# Patient Record
Sex: MALE | Race: BLACK OR AFRICAN AMERICAN | NOT HISPANIC OR LATINO | Employment: UNEMPLOYED | URBAN - METROPOLITAN AREA
[De-identification: names, ages, dates, MRNs, and addresses within clinical notes are randomized per-mention and may not be internally consistent; named-entity substitution may affect disease eponyms.]

---

## 2023-05-25 ENCOUNTER — HOSPITAL ENCOUNTER (INPATIENT)
Facility: HOSPITAL | Age: 43
LOS: 4 days | Discharge: HOME | DRG: 502 | End: 2023-05-29
Attending: EMERGENCY MEDICINE | Admitting: EMERGENCY MEDICINE
Payer: MEDICAID

## 2023-05-25 DIAGNOSIS — S90.851A FOREIGN BODY OF SKIN OF PLANTAR ASPECT OF RIGHT FOOT: ICD-10-CM

## 2023-05-25 DIAGNOSIS — M79.10 MYALGIA: ICD-10-CM

## 2023-05-25 DIAGNOSIS — M79.671 BILATERAL FOOT PAIN: ICD-10-CM

## 2023-05-25 DIAGNOSIS — B20 HIV INFECTION, UNSPECIFIED SYMPTOM STATUS: ICD-10-CM

## 2023-05-25 DIAGNOSIS — L02.414 ABSCESS OF LEFT UPPER EXTREMITY: Primary | ICD-10-CM

## 2023-05-25 DIAGNOSIS — S92.414A CLOSED NONDISPLACED FRACTURE OF PROXIMAL PHALANX OF RIGHT GREAT TOE, INITIAL ENCOUNTER: ICD-10-CM

## 2023-05-25 DIAGNOSIS — R07.9 CHEST PAIN: ICD-10-CM

## 2023-05-25 DIAGNOSIS — M79.602 LEFT ARM PAIN: ICD-10-CM

## 2023-05-25 DIAGNOSIS — M79.672 BILATERAL FOOT PAIN: ICD-10-CM

## 2023-05-25 DIAGNOSIS — S50.852A FOREIGN BODY IN LEFT FOREARM, INITIAL ENCOUNTER: ICD-10-CM

## 2023-05-25 PROBLEM — S92.421A DISPLACED FRACTURE OF DISTAL PHALANX OF RIGHT GREAT TOE, INITIAL ENCOUNTER FOR CLOSED FRACTURE: Status: ACTIVE | Noted: 2023-05-25

## 2023-05-25 PROBLEM — F43.10 PTSD (POST-TRAUMATIC STRESS DISORDER): Status: ACTIVE | Noted: 2023-05-25

## 2023-05-25 PROBLEM — F12.10 TETRAHYDROCANNABINOL (THC) USE DISORDER, MILD, ABUSE: Status: ACTIVE | Noted: 2023-05-25

## 2023-05-25 PROBLEM — L02.419 ARM ABSCESS: Status: ACTIVE | Noted: 2023-05-25

## 2023-05-25 LAB
ALBUMIN SERPL BCP-MCNC: 3.3 G/DL (ref 3.5–5.2)
ALP SERPL-CCNC: 104 U/L (ref 55–135)
ALT SERPL W/O P-5'-P-CCNC: 19 U/L (ref 10–44)
ANION GAP SERPL CALC-SCNC: 13 MMOL/L (ref 8–16)
AST SERPL-CCNC: 28 U/L (ref 10–40)
BASOPHILS # BLD AUTO: 0.03 K/UL (ref 0–0.2)
BASOPHILS NFR BLD: 0.3 % (ref 0–1.9)
BILIRUB SERPL-MCNC: 0.4 MG/DL (ref 0.1–1)
BUN SERPL-MCNC: 17 MG/DL (ref 6–20)
CALCIUM SERPL-MCNC: 9.2 MG/DL (ref 8.7–10.5)
CHLORIDE SERPL-SCNC: 101 MMOL/L (ref 95–110)
CO2 SERPL-SCNC: 21 MMOL/L (ref 23–29)
CREAT SERPL-MCNC: 1.1 MG/DL (ref 0.5–1.4)
CRP SERPL-MCNC: 232.9 MG/L (ref 0–8.2)
DIFFERENTIAL METHOD: ABNORMAL
EOSINOPHIL # BLD AUTO: 0.3 K/UL (ref 0–0.5)
EOSINOPHIL NFR BLD: 2.9 % (ref 0–8)
ERYTHROCYTE [DISTWIDTH] IN BLOOD BY AUTOMATED COUNT: 13.1 % (ref 11.5–14.5)
EST. GFR  (NO RACE VARIABLE): >60 ML/MIN/1.73 M^2
GLUCOSE SERPL-MCNC: 100 MG/DL (ref 70–110)
HCT VFR BLD AUTO: 40.1 % (ref 40–54)
HGB BLD-MCNC: 13.2 G/DL (ref 14–18)
IMM GRANULOCYTES # BLD AUTO: 0.08 K/UL (ref 0–0.04)
IMM GRANULOCYTES NFR BLD AUTO: 0.8 % (ref 0–0.5)
LACTATE SERPL-SCNC: 2.9 MMOL/L (ref 0.5–2.2)
LYMPHOCYTES # BLD AUTO: 0.8 K/UL (ref 1–4.8)
LYMPHOCYTES NFR BLD: 8.1 % (ref 18–48)
MCH RBC QN AUTO: 28.4 PG (ref 27–31)
MCHC RBC AUTO-ENTMCNC: 32.9 G/DL (ref 32–36)
MCV RBC AUTO: 86 FL (ref 82–98)
MONOCYTES # BLD AUTO: 0.6 K/UL (ref 0.3–1)
MONOCYTES NFR BLD: 6 % (ref 4–15)
NEUTROPHILS # BLD AUTO: 7.8 K/UL (ref 1.8–7.7)
NEUTROPHILS NFR BLD: 81.9 % (ref 38–73)
NRBC BLD-RTO: 0 /100 WBC
PLATELET # BLD AUTO: ABNORMAL K/UL (ref 150–450)
PLATELET BLD QL SMEAR: ABNORMAL
PMV BLD AUTO: ABNORMAL FL (ref 9.2–12.9)
POCT GLUCOSE: 110 MG/DL (ref 70–110)
POTASSIUM SERPL-SCNC: 4.6 MMOL/L (ref 3.5–5.1)
PROCALCITONIN SERPL IA-MCNC: 0.52 NG/ML
PROT SERPL-MCNC: 8.1 G/DL (ref 6–8.4)
RBC # BLD AUTO: 4.64 M/UL (ref 4.6–6.2)
SODIUM SERPL-SCNC: 135 MMOL/L (ref 136–145)
WBC # BLD AUTO: 9.52 K/UL (ref 3.9–12.7)

## 2023-05-25 PROCEDURE — 86140 C-REACTIVE PROTEIN: CPT | Performed by: PHYSICIAN ASSISTANT

## 2023-05-25 PROCEDURE — 93010 ELECTROCARDIOGRAM REPORT: CPT | Mod: ,,, | Performed by: INTERNAL MEDICINE

## 2023-05-25 PROCEDURE — 93010 EKG 12-LEAD: ICD-10-PCS | Mod: ,,, | Performed by: INTERNAL MEDICINE

## 2023-05-25 PROCEDURE — 96367 TX/PROPH/DG ADDL SEQ IV INF: CPT

## 2023-05-25 PROCEDURE — 80053 COMPREHEN METABOLIC PANEL: CPT | Performed by: PHYSICIAN ASSISTANT

## 2023-05-25 PROCEDURE — 96365 THER/PROPH/DIAG IV INF INIT: CPT

## 2023-05-25 PROCEDURE — 85025 COMPLETE CBC W/AUTO DIFF WBC: CPT | Performed by: PHYSICIAN ASSISTANT

## 2023-05-25 PROCEDURE — 87040 BLOOD CULTURE FOR BACTERIA: CPT | Mod: 59 | Performed by: PHYSICIAN ASSISTANT

## 2023-05-25 PROCEDURE — 83605 ASSAY OF LACTIC ACID: CPT | Performed by: PHYSICIAN ASSISTANT

## 2023-05-25 PROCEDURE — 86592 SYPHILIS TEST NON-TREP QUAL: CPT | Performed by: PHYSICIAN ASSISTANT

## 2023-05-25 PROCEDURE — 99285 EMERGENCY DEPT VISIT HI MDM: CPT | Mod: 25

## 2023-05-25 PROCEDURE — 25000003 PHARM REV CODE 250: Performed by: PHYSICIAN ASSISTANT

## 2023-05-25 PROCEDURE — 93005 ELECTROCARDIOGRAM TRACING: CPT

## 2023-05-25 PROCEDURE — 11000001 HC ACUTE MED/SURG PRIVATE ROOM

## 2023-05-25 PROCEDURE — 63600175 PHARM REV CODE 636 W HCPCS: Performed by: PHYSICIAN ASSISTANT

## 2023-05-25 PROCEDURE — 86361 T CELL ABSOLUTE COUNT: CPT | Performed by: PHYSICIAN ASSISTANT

## 2023-05-25 PROCEDURE — 84145 PROCALCITONIN (PCT): CPT | Performed by: PHYSICIAN ASSISTANT

## 2023-05-25 PROCEDURE — 10061 I&D ABSCESS COMP/MULTIPLE: CPT

## 2023-05-25 PROCEDURE — 82962 GLUCOSE BLOOD TEST: CPT

## 2023-05-25 RX ORDER — TALC
6 POWDER (GRAM) TOPICAL NIGHTLY PRN
Status: DISCONTINUED | OUTPATIENT
Start: 2023-05-25 | End: 2023-05-29 | Stop reason: HOSPADM

## 2023-05-25 RX ORDER — OXYCODONE AND ACETAMINOPHEN 5; 325 MG/1; MG/1
1 TABLET ORAL
Status: COMPLETED | OUTPATIENT
Start: 2023-05-25 | End: 2023-05-25

## 2023-05-25 RX ORDER — IBUPROFEN 200 MG
16 TABLET ORAL
Status: DISCONTINUED | OUTPATIENT
Start: 2023-05-25 | End: 2023-05-26

## 2023-05-25 RX ORDER — AMOXICILLIN 250 MG
1 CAPSULE ORAL DAILY PRN
Status: DISCONTINUED | OUTPATIENT
Start: 2023-05-25 | End: 2023-05-29 | Stop reason: HOSPADM

## 2023-05-25 RX ORDER — OXYCODONE AND ACETAMINOPHEN 5; 325 MG/1; MG/1
1 TABLET ORAL EVERY 8 HOURS PRN
Status: DISCONTINUED | OUTPATIENT
Start: 2023-05-25 | End: 2023-05-27

## 2023-05-25 RX ORDER — SODIUM CHLORIDE 0.9 % (FLUSH) 0.9 %
10 SYRINGE (ML) INJECTION
Status: DISCONTINUED | OUTPATIENT
Start: 2023-05-25 | End: 2023-05-29 | Stop reason: HOSPADM

## 2023-05-25 RX ORDER — SERTRALINE HYDROCHLORIDE 50 MG/1
100 TABLET, FILM COATED ORAL DAILY
Status: DISCONTINUED | OUTPATIENT
Start: 2023-05-26 | End: 2023-05-29 | Stop reason: HOSPADM

## 2023-05-25 RX ORDER — NALOXONE HCL 0.4 MG/ML
0.02 VIAL (ML) INJECTION
Status: DISCONTINUED | OUTPATIENT
Start: 2023-05-25 | End: 2023-05-29 | Stop reason: HOSPADM

## 2023-05-25 RX ORDER — IBUPROFEN 200 MG
24 TABLET ORAL
Status: DISCONTINUED | OUTPATIENT
Start: 2023-05-25 | End: 2023-05-26

## 2023-05-25 RX ORDER — AMITRIPTYLINE HYDROCHLORIDE 50 MG/1
1 TABLET, FILM COATED ORAL 2 TIMES DAILY
COMMUNITY
Start: 2023-05-19 | End: 2023-07-30 | Stop reason: SDUPTHER

## 2023-05-25 RX ORDER — BICTEGRAVIR SODIUM, EMTRICITABINE, AND TENOFOVIR ALAFENAMIDE FUMARATE 50; 200; 25 MG/1; MG/1; MG/1
1 TABLET ORAL DAILY
COMMUNITY
Start: 2023-03-27 | End: 2023-07-30 | Stop reason: SDUPTHER

## 2023-05-25 RX ORDER — LANOLIN ALCOHOL/MO/W.PET/CERES
800 CREAM (GRAM) TOPICAL
Status: DISCONTINUED | OUTPATIENT
Start: 2023-05-25 | End: 2023-05-26

## 2023-05-25 RX ORDER — GLUCAGON 1 MG
1 KIT INJECTION
Status: DISCONTINUED | OUTPATIENT
Start: 2023-05-25 | End: 2023-05-26

## 2023-05-25 RX ORDER — CEFTRIAXONE 2 G/50ML
2 INJECTION, SOLUTION INTRAVENOUS
Status: COMPLETED | OUTPATIENT
Start: 2023-05-25 | End: 2023-05-25

## 2023-05-25 RX ORDER — SODIUM CHLORIDE 0.9 % (FLUSH) 0.9 %
10 SYRINGE (ML) INJECTION EVERY 8 HOURS
Status: DISCONTINUED | OUTPATIENT
Start: 2023-05-25 | End: 2023-05-25

## 2023-05-25 RX ORDER — SERTRALINE HYDROCHLORIDE 100 MG/1
100 TABLET, FILM COATED ORAL DAILY
COMMUNITY
Start: 2023-05-19 | End: 2023-07-30 | Stop reason: SDUPTHER

## 2023-05-25 RX ORDER — AMITRIPTYLINE HYDROCHLORIDE 25 MG/1
50 TABLET, FILM COATED ORAL 2 TIMES DAILY
Status: DISCONTINUED | OUTPATIENT
Start: 2023-05-25 | End: 2023-05-29 | Stop reason: HOSPADM

## 2023-05-25 RX ORDER — ACETAMINOPHEN 325 MG/1
650 TABLET ORAL EVERY 4 HOURS PRN
Status: DISCONTINUED | OUTPATIENT
Start: 2023-05-25 | End: 2023-05-29 | Stop reason: HOSPADM

## 2023-05-25 RX ORDER — LIDOCAINE HYDROCHLORIDE 10 MG/ML
10 INJECTION INFILTRATION; PERINEURAL
Status: COMPLETED | OUTPATIENT
Start: 2023-05-25 | End: 2023-05-25

## 2023-05-25 RX ORDER — CEFEPIME HYDROCHLORIDE 1 G/50ML
2 INJECTION, SOLUTION INTRAVENOUS
Status: DISCONTINUED | OUTPATIENT
Start: 2023-05-26 | End: 2023-05-25

## 2023-05-25 RX ADMIN — LIDOCAINE HYDROCHLORIDE 10 ML: 10 INJECTION, SOLUTION INFILTRATION; PERINEURAL at 05:05

## 2023-05-25 RX ADMIN — VANCOMYCIN HYDROCHLORIDE 1000 MG: 1 INJECTION, POWDER, LYOPHILIZED, FOR SOLUTION INTRAVENOUS at 05:05

## 2023-05-25 RX ADMIN — OXYCODONE AND ACETAMINOPHEN 1 TABLET: 5; 325 TABLET ORAL at 04:05

## 2023-05-25 RX ADMIN — SODIUM CHLORIDE 1178 ML: 9 INJECTION, SOLUTION INTRAVENOUS at 05:05

## 2023-05-25 RX ADMIN — SODIUM CHLORIDE 1000 ML: 9 INJECTION, SOLUTION INTRAVENOUS at 04:05

## 2023-05-25 RX ADMIN — CEFTRIAXONE 2 G: 2 INJECTION, SOLUTION INTRAVENOUS at 04:05

## 2023-05-25 NOTE — ASSESSMENT & PLAN NOTE
Complained of toe pain in the ED, x-ray with Minimally displaced intra-articular fracture involving the medial base of the proximal phalanx of the right great toe  Placed in post op shoe in ED  Ortho consulted

## 2023-05-25 NOTE — ED PROVIDER NOTES
Encounter Date: 5/25/2023    SCRIBE #1 NOTE: I, Jin Blanco, am scribing for, and in the presence of,  Wisam Beard PA-C. I have scribed the following portions of the note - Other sections scribed: HPI, ROS.     History     Chief Complaint   Patient presents with    Insect Bite     EMS called out to facility for a homeless male seeing treatment for a bug bite to L arm.    Ken Guerrero Jr. is a 42 y.o. male with a PMHx of HIV, who presents to the ED for evaluation of left upper arm pain onset 3-4 days ago. Patient also c/o bilateral foot pain onset 1-2 weeks ago. Patient states the left arm pain radiates from his mid arm up to his left shoulder. He has an insect bite to the area that is painful with touch. Notes the bilateral foot pain radiates from his mid planum foot to the heel. Patient had surgery two years ago on his left foot due to him wearing no shoes and causing an infection to the area. No hx of blood clots. He is allergic to ibuprofen and Vicodin. Denies fever and NVD.    The history is provided by the patient. No  was used.   Review of patient's allergies indicates:  No Known Allergies  No past medical history on file.  No past surgical history on file.  No family history on file.     Review of Systems   Constitutional:  Negative for fever.   HENT:  Negative for congestion, sore throat and trouble swallowing.    Respiratory:  Negative for cough and shortness of breath.    Cardiovascular:  Negative for chest pain.   Gastrointestinal:  Negative for abdominal pain, constipation, diarrhea, nausea and vomiting.   Genitourinary:  Negative for dysuria, flank pain, frequency and urgency.   Musculoskeletal:  Negative for back pain.        (+) arm pain (+) foot pain   Skin:  Negative for rash.   Neurological:  Negative for headaches.   All other systems reviewed and are negative.    Physical Exam     Initial Vitals [05/25/23 1512]   BP Pulse Resp Temp SpO2   (!) 146/80 88 18 98 °F  (36.7 °C) 98 %      MAP       --         Physical Exam    Nursing note and vitals reviewed.  Constitutional: He appears well-developed and well-nourished. He is not diaphoretic. No distress.   HENT:   Head: Atraumatic.   Right Ear: External ear normal.   Left Ear: External ear normal.   Mouth/Throat: Oropharynx is clear and moist.   Eyes: Conjunctivae are normal.   Neck: No tracheal deviation present.   Normal range of motion.  Cardiovascular:  Normal rate and regular rhythm.           Pulmonary/Chest: No accessory muscle usage or stridor. No tachypnea. No respiratory distress.   Abdominal: Abdomen is soft. He exhibits no distension. There is no abdominal tenderness. There is no guarding.   Musculoskeletal:      Cervical back: Normal range of motion.      Comments: Diffuse tenderness to soft touch to the entirety of the bilateral feet.  No obvious deformities.  No erythema.  Punctate open wound to the plantar surface of the right foot over the heel.  No obvious foreign body on exam.  Full ROM of lower extremities.  Ambulatory.  Distal skin perfusion normal.     Neurological: He has normal strength. He displays no tremor. He displays no seizure activity. Coordination and gait normal.   Skin: Skin is intact. Capillary refill takes less than 2 seconds. No cyanosis.   Diffuse scattered maculopapular hyperpigmented skin lesions. No palmar or plantar involvement. Nontender. No erythema or mucosal lesions.     Large area of tenderness, erythema, and induration to the left anterior biceps.  No extension of erythema over the joints.  Full ROM of upper extremity joints.  No active drainage.  Soft compartments.      No obvious foreign body to the left forearm.       ED Course   I & D - Incision and Drainage    Date/Time: 5/25/2023 8:19 PM  Location procedure was performed: [facility] EMERGENCY DEPARTMENT  Performed by: Wisam Beard PA-C  Authorized by: Jori Kovacs MD   Consent Done: Yes  Consent: Verbal consent  obtained.  Consent given by: patient  Type: abscess  Location: LUE.  Anesthesia: local infiltration    Anesthesia:  Local Anesthetic: lidocaine 1% without epinephrine    Patient sedated: no  Scalpel size: 11  Incision type: single straight  Complexity: complex  Drainage: pus  Drainage amount: copious  Wound treatment: incision, wound left open, deloculation and expression of material  Packing material: 1/4 in gauze  Complications: No  Specimens: No  Implants: No  Patient tolerance: Patient tolerated the procedure well with no immediate complications      Labs Reviewed   CBC W/ AUTO DIFFERENTIAL - Abnormal; Notable for the following components:       Result Value    Hemoglobin 13.2 (*)     Immature Granulocytes 0.8 (*)     Gran # (ANC) 7.8 (*)     Immature Grans (Abs) 0.08 (*)     Lymph # 0.8 (*)     Gran % 81.9 (*)     Lymph % 8.1 (*)     Platelet Estimate Clumped (*)     All other components within normal limits    Narrative:     Release to patient->Immediate   COMPREHENSIVE METABOLIC PANEL - Abnormal; Notable for the following components:    Sodium 135 (*)     CO2 21 (*)     Albumin 3.3 (*)     All other components within normal limits    Narrative:     Release to patient->Immediate   C-REACTIVE PROTEIN - Abnormal; Notable for the following components:    .9 (*)     All other components within normal limits    Narrative:     Release to patient->Immediate   PROCALCITONIN - Abnormal; Notable for the following components:    Procalcitonin 0.52 (*)     All other components within normal limits    Narrative:     Release to patient->Immediate   LACTIC ACID, PLASMA - Abnormal; Notable for the following components:    Lactate (Lactic Acid) 2.9 (*)     All other components within normal limits    Narrative:     Release to patient->Immediate   CULTURE, BLOOD   CULTURE, BLOOD   CULTURE, AEROBIC  (SPECIFY SOURCE)   RPR   T-HELPER CELLS (CD4) COUNT   DRUG SCREEN PANEL, URINE EMERGENCY   POCT GLUCOSE     EKG Readings:  (Independently Interpreted)   Initial Reading: No STEMI. Rhythm: Normal Sinus Rhythm. Heart Rate: 78. Axis: Normal.     Imaging Results               X-Ray Humerus 2 View Left (Final result)  Result time 05/25/23 16:30:04      Final result by Darío Caballero MD (05/25/23 16:30:04)                   Impression:      No evidence for acute fracture, bone destruction, or dislocation.    4 mm lucency projecting over the soft tissues of the left upper arm at the level of the mid humerus.  In this patient with history of bone bite, this may be related to skin puncture although small abscess cannot be excluded.    Linear metallic density projecting over the proximal left forearm measuring approximately 7 mm in length.  A metallic soft tissue foreign body cannot be excluded.    This report was flagged in Epic as abnormal.      Electronically signed by: Darío Caballero MD  Date:    05/25/2023  Time:    16:30               Narrative:    EXAMINATION:  XR HUMERUS 2 VIEW LEFT    CLINICAL HISTORY:  Pain in left arm    TECHNIQUE:  Two views of the left humerus were obtained.    COMPARISON:  None    FINDINGS:  The left humerus appears intact.  There is no evidence for acute fracture or bone destruction.  There is a rounded lucency measuring approximately 4 mm in size projecting over the soft tissues of the left upper arm at approximately the level of the mid humerus.  This may represent a small air bubble within the soft tissues in this patient with history of bug bite.  Small abscess cavity cannot be completely excluded.  On 1 of the views, there is a linear metallic density projecting over the proximal left forearm.  A soft tissue foreign body cannot be excluded.  If clinically indicated, further evaluation with dedicated views of the left forearm could be obtained.                                       X-Ray Foot Complete Bilateral (Final result)  Result time 05/25/23 16:32:05      Final result by Darío Caballero MD (05/25/23  16:32:05)                   Impression:      Minimally displaced intra-articular fracture involving the medial base of the proximal phalanx of the right great toe.      Electronically signed by: Darío Caballero MD  Date:    05/25/2023  Time:    16:32               Narrative:    EXAMINATION:  XR FOOT COMPLETE 3 VIEW BILATERAL    CLINICAL HISTORY:  Pain in right foot    TECHNIQUE:  AP, lateral, and oblique views of both feet were performed.    COMPARISON:  None    FINDINGS:  There is a fracture involving the medial base of the proximal phalanx of the right great toe with intra-articular extension of the fracture line.  This is likely acute or subacute.  The remainder of the bones appear intact.  There is no evidence for dislocation.  No bony erosions are identified.  Soft tissues are unremarkable.                                       Medications   vancomycin (VANCOCIN) 1,000 mg in dextrose 5 % (D5W) 250 mL IVPB (Vial-Mate) (1,000 mg Intravenous Trough Due As Scheduled Before Dose 5/27/23 0500)   melatonin tablet 6 mg (has no administration in time range)   senna-docusate 8.6-50 mg per tablet 1 tablet (has no administration in time range)   acetaminophen tablet 650 mg (has no administration in time range)   naloxone 0.4 mg/mL injection 0.02 mg (has no administration in time range)   magnesium oxide tablet 800 mg (has no administration in time range)   magnesium oxide tablet 800 mg (has no administration in time range)   glucose chewable tablet 16 g (has no administration in time range)   glucose chewable tablet 24 g (has no administration in time range)   dextrose 50% injection 12.5 g (has no administration in time range)   dextrose 50% injection 25 g (has no administration in time range)   glucagon (human recombinant) injection 1 mg (has no administration in time range)   piperacillin-tazobactam (ZOSYN) 4.5 g in dextrose 5 % in water (D5W) 5 % 100 mL IVPB (MB+) (has no administration in time range)   sodium chloride  0.9% flush 10 mL (has no administration in time range)   amitriptyline tablet 50 mg (has no administration in time range)   chxzirocr-osyxaenv-olxwnqv ala -25 mg (25 kg or greater) 1 tablet (has no administration in time range)   sertraline tablet 100 mg (has no administration in time range)   vancomycin - pharmacy to dose (has no administration in time range)   oxyCODONE-acetaminophen 5-325 mg per tablet 1 tablet (has no administration in time range)   cefTRIAXone 2 gram/50 mL IVPB 2 g (0 g Intravenous Stopped 5/25/23 1736)   oxyCODONE-acetaminophen 5-325 mg per tablet 1 tablet (1 tablet Oral Given 5/25/23 1631)   sodium chloride 0.9% bolus 1,000 mL 1,000 mL (0 mLs Intravenous Stopped 5/25/23 1736)   sodium chloride 0.9% bolus 1,178 mL 1,178 mL (1,178 mLs Intravenous New Bag 5/25/23 1757)   LIDOcaine HCL 10 mg/ml (1%) injection 10 mL (10 mLs Infiltration Given 5/25/23 1758)   vancomycin (VANCOCIN) 1,000 mg in dextrose 5 % (D5W) 250 mL IVPB (Vial-Mate) (0 mg Intravenous Stopped 5/25/23 1927)     Medical Decision Making:   History:   Old Medical Records: I decided to obtain old medical records.  Old Records Summarized: records from clinic visits.       <> Summary of Records: External documents reviewed.  Clinical Tests:   Lab Tests: Ordered and Reviewed  Radiological Study: Ordered and Reviewed  ED Management:  Multiple complaints.  Advised by EMS he may be homeless.  HIV.  Poor historian.  Noncompliant with his antivirals and unclear CD4 count.    Primary complaint is a large abscess to the left bicep drained in the ED without complication.  Possibly septic in the setting of immunocompromised state with elevated lactic acid, procalcitonin, and CRP.  No evidence of joint involvement or necrotizing fasciitis.    Remote foreign body no to the left forearm which I do not feel is related to his current infection today.  No indication for removal today as it is likely chronic.  There is a separate foreign body to  the left plantar surface which appears acute.  Patient refuses removal in the ED today despite my recommendation.  No signs of active infectious process to the plantar surface of the foot.  Incidentally, he also appears to have an acute fracture of the right great toe proximal phalanx.  Closed.  No significant displacement.  Postop shoe for comfort.    I feel patient is high-risk for follow-up and compliance with antibiotic regimen as an outpatient.  Given that patient is potentially septic in his immunocompromised state with multiple issues at the same time, I feel he would benefit from IV antibiotics and repeat evaluation to ensure improvement.  Patient agreeable to plan.        Scribe Attestation:   Scribe #1: I performed the above scribed service and the documentation accurately describes the services I performed. I attest to the accuracy of the note.      ED Course as of 05/25/23 2027   Thu May 25, 2023   1711 Estimated CD4 count 771 [JM]      ED Course User Index  [JM] Jori Kovacs MD          I, Wisam Beard PA-C, personally performed the services described in this documentation.  All medical record entries made by the scribe were at my direction and in my presence.  I have reviewed the chart and agree that the record reflects my personal performance and is accurate and complete.        Clinical Impression:   Final diagnoses:  [M79.602] Left arm pain  [M79.671, M79.672] Bilateral foot pain  [M79.10] Myalgia  [S92.414A] Closed nondisplaced fracture of proximal phalanx of right great toe, initial encounter  [B20] HIV infection, unspecified symptom status  [S90.851A] Foreign body of skin of plantar aspect of right foot  [S50.852A] Foreign body in left forearm, initial encounter  [L02.414] Abscess of left upper extremity (Primary)  [R07.9] Chest pain        ED Disposition Condition    Admit Stable                Wisam Beard PA-C  05/25/23 2027

## 2023-05-25 NOTE — ED NOTES
"Lt bicep area warm/ red/ tight and VERY tender to touch. Many, many "sores" on his arms and legs.   Pain to both feet bilat w no evident trauma noted.   Patient endorses: PMH significant for HIV, inconsistent compliance w meds.  He does not make eye contact, Over reacts to being touched.  Dirty and and mismatched shoes, NO socks.  Appears homeless.     "

## 2023-05-25 NOTE — ASSESSMENT & PLAN NOTE
He reports a history of HIV disease  On biktarvy, will resume though unclear as to compliance he states daily but then reports sometimes weekly compliance. Check HIV RNA and CD4 count      This patient in known to have HIV+ status (Have detected HIV PCR but never CD4 <200 or AIDS defining illness). Labs reviewed- No results found for: CD4, No results found for: HIVDNAPCR.

## 2023-05-25 NOTE — PHARMACY MED REC
"Admission Medication History     The home medication history was taken by Carol Adam.    You may go to "Admission" then "Reconcile Home Medications" tabs to review and/or act upon these items.     The home medication list has been updated by the Pharmacy department.   Please read ALL comments highlighted in yellow.   Please address this information as you see fit.    Feel free to contact us if you have any questions or require assistance.      Medications listed below were obtained from: Patient/family and Analytic software- [employer]  (Not in a hospital admission)          Carol Adam  [phone]                 .        "

## 2023-05-25 NOTE — PROGRESS NOTES
Pharmacokinetic Initial Assessment: IV Vancomycin    Assessment/Plan:    Initiate intravenous vancomycin with loading dose of 1000 mg once followed by a maintenance dose of vancomycin 1000 mg IV every 12 hours  Desired empiric serum trough concentration is 10 to 20 mcg/mL  Draw vancomycin trough level 60 min prior to fourth dose on 5/27/23 at approximately 05:00  Pharmacy will continue to follow and monitor vancomycin.      Please contact pharmacy at extension [phone] with any questions regarding this assessment.     Thank you for the consult,   Joanne Staley       Patient brief summary:  Gisela Guerrero Jr. is a 42 y.o. male initiated on antimicrobial therapy with IV Vancomycin for treatment of suspected skin & soft tissue infection    Drug Allergies:   Review of patient's allergies indicates:  No Known Allergies    Actual Body Weight:   72.6 kg    Renal Function:   Estimated Creatinine Clearance: 91.7 mL/min (based on SCr of 1.1 mg/dL).,     Dialysis Method (if applicable):  N/A    CBC (last 72 hours):  Recent Labs   Lab Result Units 05/25/23  1604   WBC K/uL 9.52   Hemoglobin g/dL 13.2*   Hematocrit % 40.1   Platelets K/uL SEE COMMENT   Gran % % 81.9*   Lymph % % 8.1*   Mono % % 6.0   Eosinophil % % 2.9   Basophil % % 0.3   Differential Method  Automated       Metabolic Panel (last 72 hours):  Recent Labs   Lab Result Units 05/25/23  1604   Sodium mmol/L 135*   Potassium mmol/L 4.6   Chloride mmol/L 101   CO2 mmol/L 21*   Glucose mg/dL 100   BUN mg/dL 17   Creatinine mg/dL 1.1   Albumin g/dL 3.3*   Total Bilirubin mg/dL 0.4   Alkaline Phosphatase U/L 104   AST U/L 28   ALT U/L 19       Drug levels (last 3 results):  No results for input(s): VANCOMYCINRA, VANCORANDOM, VANCOMYCINPE, VANCOPEAK, VANCOMYCINTR, VANCOTROUGH in the last 72 hours.    Microbiologic Results:  Microbiology Results (last 7 days)       Procedure Component Value Units Date/Time    Blood Culture #2 **CANNOT BE ORDERED STAT** [[id number]] Collected:  05/25/23 1608    Order Status: Sent Specimen: Blood from Peripheral, Hand, Right Updated: 05/25/23 1617    Blood Culture #1 **CANNOT BE ORDERED STAT** [[id number]] Collected: 05/25/23 1605    Order Status: Sent Specimen: Blood from Peripheral, Forearm, Right Updated: 05/25/23 1618

## 2023-05-26 ENCOUNTER — ANESTHESIA EVENT (OUTPATIENT)
Dept: SURGERY | Facility: HOSPITAL | Age: 43
DRG: 502 | End: 2023-05-26
Payer: MEDICAID

## 2023-05-26 ENCOUNTER — DOCUMENTATION ONLY (OUTPATIENT)
Dept: RADIOLOGY | Facility: HOSPITAL | Age: 43
End: 2023-05-26

## 2023-05-26 PROBLEM — Z21 ASYMPTOMATIC HIV INFECTION: Status: ACTIVE | Noted: 2023-05-25

## 2023-05-26 LAB
ALBUMIN SERPL BCP-MCNC: 2.5 G/DL (ref 3.5–5.2)
ALP SERPL-CCNC: 81 U/L (ref 55–135)
ALT SERPL W/O P-5'-P-CCNC: 17 U/L (ref 10–44)
ANION GAP SERPL CALC-SCNC: 7 MMOL/L (ref 8–16)
AST SERPL-CCNC: 15 U/L (ref 10–40)
BASOPHILS # BLD AUTO: 0.03 K/UL (ref 0–0.2)
BASOPHILS NFR BLD: 0.4 % (ref 0–1.9)
BILIRUB SERPL-MCNC: 0.4 MG/DL (ref 0.1–1)
BUN SERPL-MCNC: 14 MG/DL (ref 6–20)
CALCIUM SERPL-MCNC: 8.4 MG/DL (ref 8.7–10.5)
CD3+CD4+ CELLS # BLD: 377 CELLS/UL (ref 300–1400)
CD3+CD4+ CELLS NFR BLD: 42 % (ref 28–57)
CHLORIDE SERPL-SCNC: 103 MMOL/L (ref 95–110)
CO2 SERPL-SCNC: 25 MMOL/L (ref 23–29)
CREAT SERPL-MCNC: 0.9 MG/DL (ref 0.5–1.4)
DIFFERENTIAL METHOD: ABNORMAL
EOSINOPHIL # BLD AUTO: 0.1 K/UL (ref 0–0.5)
EOSINOPHIL NFR BLD: 0.6 % (ref 0–8)
ERYTHROCYTE [DISTWIDTH] IN BLOOD BY AUTOMATED COUNT: 13.1 % (ref 11.5–14.5)
EST. GFR  (NO RACE VARIABLE): >60 ML/MIN/1.73 M^2
GLUCOSE SERPL-MCNC: 114 MG/DL (ref 70–110)
HCT VFR BLD AUTO: 34.6 % (ref 40–54)
HGB BLD-MCNC: 10.8 G/DL (ref 14–18)
IMM GRANULOCYTES # BLD AUTO: 0.03 K/UL (ref 0–0.04)
IMM GRANULOCYTES NFR BLD AUTO: 0.4 % (ref 0–0.5)
LYMPHOCYTES # BLD AUTO: 1.1 K/UL (ref 1–4.8)
LYMPHOCYTES NFR BLD: 14.1 % (ref 18–48)
MCH RBC QN AUTO: 27.5 PG (ref 27–31)
MCHC RBC AUTO-ENTMCNC: 31.2 G/DL (ref 32–36)
MCV RBC AUTO: 88 FL (ref 82–98)
MONOCYTES # BLD AUTO: 0.7 K/UL (ref 0.3–1)
MONOCYTES NFR BLD: 8.7 % (ref 4–15)
NEUTROPHILS # BLD AUTO: 6 K/UL (ref 1.8–7.7)
NEUTROPHILS NFR BLD: 75.8 % (ref 38–73)
NRBC BLD-RTO: 0 /100 WBC
PLATELET # BLD AUTO: 254 K/UL (ref 150–450)
PMV BLD AUTO: 9.3 FL (ref 9.2–12.9)
POTASSIUM SERPL-SCNC: 4.2 MMOL/L (ref 3.5–5.1)
PROT SERPL-MCNC: 6.3 G/DL (ref 6–8.4)
RBC # BLD AUTO: 3.93 M/UL (ref 4.6–6.2)
RPR SER QL: NORMAL
SODIUM SERPL-SCNC: 135 MMOL/L (ref 136–145)
WBC # BLD AUTO: 7.89 K/UL (ref 3.9–12.7)

## 2023-05-26 PROCEDURE — 63600175 PHARM REV CODE 636 W HCPCS: Performed by: PHYSICIAN ASSISTANT

## 2023-05-26 PROCEDURE — 63600175 PHARM REV CODE 636 W HCPCS: Performed by: EMERGENCY MEDICINE

## 2023-05-26 PROCEDURE — 85025 COMPLETE CBC W/AUTO DIFF WBC: CPT | Performed by: PHYSICIAN ASSISTANT

## 2023-05-26 PROCEDURE — 36415 COLL VENOUS BLD VENIPUNCTURE: CPT | Performed by: PHYSICIAN ASSISTANT

## 2023-05-26 PROCEDURE — 25000003 PHARM REV CODE 250: Performed by: PHYSICIAN ASSISTANT

## 2023-05-26 PROCEDURE — 11000001 HC ACUTE MED/SURG PRIVATE ROOM

## 2023-05-26 PROCEDURE — 80053 COMPREHEN METABOLIC PANEL: CPT | Performed by: PHYSICIAN ASSISTANT

## 2023-05-26 PROCEDURE — 25000003 PHARM REV CODE 250: Performed by: EMERGENCY MEDICINE

## 2023-05-26 RX ADMIN — OXYCODONE AND ACETAMINOPHEN 1 TABLET: 5; 325 TABLET ORAL at 09:05

## 2023-05-26 RX ADMIN — AMITRIPTYLINE HYDROCHLORIDE 50 MG: 25 TABLET, FILM COATED ORAL at 09:05

## 2023-05-26 RX ADMIN — VANCOMYCIN HYDROCHLORIDE 1000 MG: 1 INJECTION, POWDER, LYOPHILIZED, FOR SOLUTION INTRAVENOUS at 08:05

## 2023-05-26 RX ADMIN — VANCOMYCIN HYDROCHLORIDE 1000 MG: 1 INJECTION, POWDER, LYOPHILIZED, FOR SOLUTION INTRAVENOUS at 09:05

## 2023-05-26 RX ADMIN — PIPERACILLIN AND TAZOBACTAM 4.5 G: 4; .5 INJECTION, POWDER, LYOPHILIZED, FOR SOLUTION INTRAVENOUS; PARENTERAL at 11:05

## 2023-05-26 RX ADMIN — PIPERACILLIN AND TAZOBACTAM 4.5 G: 4; .5 INJECTION, POWDER, LYOPHILIZED, FOR SOLUTION INTRAVENOUS; PARENTERAL at 04:05

## 2023-05-26 RX ADMIN — Medication 6 MG: at 12:05

## 2023-05-26 RX ADMIN — OXYCODONE AND ACETAMINOPHEN 1 TABLET: 5; 325 TABLET ORAL at 03:05

## 2023-05-26 RX ADMIN — BICTEGRAVIR SODIUM, EMTRICITABINE, AND TENOFOVIR ALAFENAMIDE FUMARATE 1 TABLET: 50; 200; 25 TABLET ORAL at 04:05

## 2023-05-26 RX ADMIN — SERTRALINE 100 MG: 50 TABLET, FILM COATED ORAL at 04:05

## 2023-05-26 RX ADMIN — AMITRIPTYLINE HYDROCHLORIDE 50 MG: 25 TABLET, FILM COATED ORAL at 12:05

## 2023-05-26 NOTE — ASSESSMENT & PLAN NOTE
Complained of toe pain in the ED, x-ray with Minimally displaced intra-articular fracture involving the medial base of the proximal phalanx of the right great toe  - Place in post op shoe  - Ortho consulted

## 2023-05-26 NOTE — PLAN OF CARE
Case Management Assessment     PCP: [facility]  Pharmacy: [facility]    Patient Arrived From: Home  Existing Help at Home: None    Barriers to Discharge: None    Discharge Plan:    A. Home    B. Home       SW completed initial assessment and discussed discharge planning with patient at his bedside. Patient stated that he lives alone and he no support system. Patient stated that he will take public transportation to get home when discharge from the hospital.      05/25/23 2031   Discharge Assessment   Assessment Type Discharge Planning Assessment   Confirmed/corrected address, phone number and insurance Yes   Confirmed Demographics Correct on Facesheet   Source of Information patient   When was your last doctors appointment?   ([facility] that [facility]   Communicated PETER with patient/caregiver Date not available/Unable to determine   Reason For Admission Arm abscess   People in Home alone   Do you expect to return to your current living situation? Yes   Do you have help at home or someone to help you manage your care at home? No   Prior to hospitilization cognitive status: Alert/Oriented   Current cognitive status: Alert/Oriented   Equipment Currently Used at Home none   Readmission within 30 days? No   Patient currently being followed by outpatient case management? No   Do you currently have service(s) that help you manage your care at home? No   Do you take prescription medications? Yes   Do you have prescription coverage? Yes   Coverage Medicaid   Do you have any problems affording any of your prescribed medications? No   Is the patient taking medications as prescribed? yes   Who is going to help you get home at discharge? Patient stated that he will catch the bus to get home   How do you get to doctors appointments? public transportation   Are you on dialysis? No   Do you take coumadin? No   Discharge Plan A Home   Discharge Plan B Home   DME Needed Upon Discharge  none    Discharge Plan discussed with: Patient   Transition of Care Barriers None

## 2023-05-26 NOTE — ASSESSMENT & PLAN NOTE
Presents with abscess of left arm. X-ray arm without evidence of fracture though 4mm lucency possibly skin puncture, and linear metalic density over left forearm. Abscess drained in ED but cultures not collected from abscess  - Started on zosyn/vanc  - Blood cultures NGTD  - Ortho consulted- NPO until evaluation later today to see if he needs further surgical drainage

## 2023-05-26 NOTE — ASSESSMENT & PLAN NOTE
He reports a history of HIV positive status. On biktarvy, will resume though unclear as to compliance he states daily but then reports sometimes weekly compliance. No prior labs to review  - Check HIV RNA and CD4 count  - at this time, no signs of HIV disease

## 2023-05-26 NOTE — PROGRESS NOTES
[facility] Medicine  Progress Note    Patient Name: Gisela Guerrero Jr.  MRN: [id number]  Patient Class: IP- Inpatient   Admission Date: 5/25/2023  Length of Stay: 1 days  Attending Physician: Lisa Houser MD  Primary Care Provider: Primary Doctor No        Subjective:     Principal Problem:Arm abscess        HPI:  Gisela Guerrero Jr. 42 y.o. male with HIV, PTSD, THC use presents to the hospital with a chief complaint of arm pain and foot pain.  He reports 2 weeks of constant progressive right toe pain.  He attributes this pain due to frequent ambulation and denies any trauma to the foot.  He presented to the hospital today due to left arm swelling which he describes as a heavy pressure that is improved with incision and drainage in the emergency room.  He denies any IV drug use and reports swelling began initially as a bug bite.  He reports he is intermittently compliant with home Biktarvy sometimes taking daily and sometimes weekly.  He denies any recent antibiotic use.  He denies fever chest pain shortness of breath nausea vomiting abdominal pain hematuria hematemesis.    Afebrile without leukocytosis x-ray foot with Minimally displaced intra-articular fracture involving the medial base of the proximal phalanx of the right great toe extra humerus with 4 mm lucency projecting over the soft tissues of the left upper arm at the level of the mid humerus.  And a Linear metallic density projecting over the proximal left forearm measuring approximately 7 mm in length.  CRP elevated lactic acid elevated.      Overview/Hospital Course:  Mr Gisela Guerrero Jr. Was admitted with left arm abscess. Started antibiotics. Ortho consulted.       Interval History: complains of R arm pain and wanting something to eat. Not talkative.     Review of Systems   Constitutional:  Negative for chills and fever.   Respiratory:  Negative for shortness of breath.    Cardiovascular:  Negative for chest pain.   Gastrointestinal:   Negative for abdominal pain.   Genitourinary:  Negative for difficulty urinating.   Musculoskeletal:  Positive for arthralgias.   Skin:  Positive for rash and wound.   Psychiatric/Behavioral:  Negative for confusion.    Objective:     Vital Signs (Most Recent):  Temp: 98.4 °F (36.9 °C) (05/26/23 0747)  Pulse: 71 (05/26/23 0747)  Resp: 19 (05/26/23 0747)  BP: 122/72 (05/26/23 0747)  SpO2: 98 % (05/26/23 0747) Vital Signs (24h Range):  Temp:  [97.8 °F (36.6 °C)-99.8 °F (37.7 °C)] 98.4 °F (36.9 °C)  Pulse:  [71-88] 71  Resp:  [18-20] 19  SpO2:  [94 %-99 %] 98 %  BP: (101-146)/(61-80) 122/72     Weight: 64.7 kg (142 lb 10.2 oz)  Body mass index is 19.35 kg/m².    Intake/Output Summary (Last 24 hours) at 5/26/2023 1025  Last data filed at 5/26/2023 1009  Gross per 24 hour   Intake 1068.84 ml   Output 1000 ml   Net 68.84 ml         Physical Exam  Vitals and nursing note reviewed.   Constitutional:       General: He is not in acute distress.     Appearance: He is not ill-appearing or toxic-appearing.   HENT:      Head: Normocephalic and atraumatic.      Nose: Nose normal.      Mouth/Throat:      Mouth: Mucous membranes are moist.   Eyes:      Conjunctiva/sclera: Conjunctivae normal.   Cardiovascular:      Rate and Rhythm: Normal rate and regular rhythm.   Pulmonary:      Effort: Pulmonary effort is normal.      Breath sounds: Normal breath sounds.   Abdominal:      General: Bowel sounds are normal.      Palpations: Abdomen is soft.   Musculoskeletal:      Comments: R arm swelling, warm to touch, tender to palpation   Skin:     General: Skin is warm and dry.   Neurological:      Mental Status: He is alert. Mental status is at baseline.   Psychiatric:      Comments: Poverty of speech. Poor eye contact           Significant Labs: All pertinent labs within the past 24 hours have been reviewed.    Significant Imaging: I have reviewed all pertinent imaging results/findings within the past 24 hours.      Assessment/Plan:      *  Arm abscess  Presents with abscess of left arm. X-ray arm without evidence of fracture though 4mm lucency possibly skin puncture, and linear metalic density over left forearm. Abscess drained in ED but cultures not collected from abscess  - Started on zosyn/vanc  - Blood cultures NGTD  - Ortho consulted- NPO until evaluation later today to see if he needs further surgical drainage      PTSD (post-traumatic stress disorder)  Continue home zoloft and elevail    Tetrahydrocannabinol (THC) use disorder, mild, abuse  Counseled on cessation.     Displaced fracture of distal phalanx of right great toe, initial encounter for closed fracture  Complained of toe pain in the ED, x-ray with Minimally displaced intra-articular fracture involving the medial base of the proximal phalanx of the right great toe  - Place in post op shoe  - Ortho consulted    Asymptomatic HIV infection  He reports a history of HIV positive status. On biktarvy, will resume though unclear as to compliance he states daily but then reports sometimes weekly compliance. No prior labs to review  - Check HIV RNA and CD4 count  - at this time, no signs of HIV disease        VTE Risk Mitigation (From admission, onward)         Ordered     IP VTE LOW RISK PATIENT  Once         05/25/23 1856     Place sequential compression device  Until discontinued         05/25/23 1856                Discharge Planning   PETER: 5/28/2023     Code Status: Full Code   Is the patient medically ready for discharge?:     Reason for patient still in hospital (select all that apply): Patient trending condition  Discharge Plan A: Home                  Lisa Houser MD  Department of Hospital Medicine   [facility] - Med Surg

## 2023-05-26 NOTE — NURSING
[facility] [place]  Nurses Note -- 4 Eyes      5/26/2023       Skin assessed on: Q Shift      [x] No Pressure Injuries Present    []Prevention Measures Documented    [] Yes LDA  for Pressure Injury Previously documented     [] Yes New Pressure Injury Discovered   [] LDA for New Pressure Injury Added      Attending RN:  Kylah Valadez, YONIS     Second RN:  Anthony Vincent RN

## 2023-05-26 NOTE — PROGRESS NOTES
Vancomycin consult follow-up:    Patient reviewed, renal function stable, no new levels, continue current therapy; Next levels due: trough due 5/27/2023 at 0700

## 2023-05-26 NOTE — PLAN OF CARE
Pain controlled with oral medication, see MAR. Patient is NPO at this time.     Problem: Adult Inpatient Plan of Care  Goal: Plan of Care Review  Outcome: Ongoing, Progressing  Goal: Optimal Comfort and Wellbeing  Outcome: Ongoing, Progressing     Problem: Fall Injury Risk  Goal: Absence of Fall and Fall-Related Injury  Outcome: Ongoing, Progressing

## 2023-05-26 NOTE — SUBJECTIVE & OBJECTIVE
No past medical history on file.    No past surgical history on file.    Review of patient's allergies indicates:  No Known Allergies    No current facility-administered medications on file prior to encounter.     Current Outpatient Medications on File Prior to Encounter   Medication Sig    amitriptyline (ELAVIL) 50 MG tablet Take 1 tablet by mouth 2 (two) times a day.    BIKTARVY -25 mg (25 kg or greater) Take 1 tablet by mouth Daily.    sertraline (ZOLOFT) 100 MG tablet Take 100 mg by mouth Daily.     Family History    None       Tobacco Use    Smoking status: Not on file    Smokeless tobacco: Not on file   Substance and Sexual Activity    Alcohol use: Not on file    Drug use: Not on file    Sexual activity: Not on file     Review of Systems  Objective:     Vital Signs (Most Recent):  Temp: 98 °F (36.7 °C) (05/25/23 1512)  Pulse: 88 (05/25/23 1512)  Resp: 18 (05/25/23 1631)  BP: (!) 146/80 (05/25/23 1512)  SpO2: 98 % (05/25/23 1512) Vital Signs (24h Range):  Temp:  [98 °F (36.7 °C)] 98 °F (36.7 °C)  Pulse:  [88] 88  Resp:  [18] 18  SpO2:  [98 %] 98 %  BP: (146)/(80) 146/80     Weight: 72.6 kg (160 lb)  Body mass index is 21.7 kg/m².     Physical Exam           Significant Labs: CBC:   Recent Labs   Lab 05/25/23  1604   WBC 9.52   HGB 13.2*   HCT 40.1   PLT SEE COMMENT     CMP:   Recent Labs   Lab 05/25/23  1604   *   K 4.6      CO2 21*      BUN 17   CREATININE 1.1   CALCIUM 9.2   PROT 8.1   ALBUMIN 3.3*   BILITOT 0.4   ALKPHOS 104   AST 28   ALT 19   ANIONGAP 13     Lactic Acid:   Recent Labs   Lab 05/25/23  1604   LACTATE 2.9*       Significant Imaging:   Imaging Results               X-Ray Humerus 2 View Left (Final result)  Result time 05/25/23 16:30:04      Final result by Darío Caballero MD (05/25/23 16:30:04)                   Impression:      No evidence for acute fracture, bone destruction, or dislocation.    4 mm lucency projecting over the soft tissues of the left upper arm at  the level of the mid humerus.  In this patient with history of bone bite, this may be related to skin puncture although small abscess cannot be excluded.    Linear metallic density projecting over the proximal left forearm measuring approximately 7 mm in length.  A metallic soft tissue foreign body cannot be excluded.    This report was flagged in Epic as abnormal.      Electronically signed by: Darío Caballero MD  Date:    05/25/2023  Time:    16:30               Narrative:    EXAMINATION:  XR HUMERUS 2 VIEW LEFT    CLINICAL HISTORY:  Pain in left arm    TECHNIQUE:  Two views of the left humerus were obtained.    COMPARISON:  None    FINDINGS:  The left humerus appears intact.  There is no evidence for acute fracture or bone destruction.  There is a rounded lucency measuring approximately 4 mm in size projecting over the soft tissues of the left upper arm at approximately the level of the mid humerus.  This may represent a small air bubble within the soft tissues in this patient with history of bug bite.  Small abscess cavity cannot be completely excluded.  On 1 of the views, there is a linear metallic density projecting over the proximal left forearm.  A soft tissue foreign body cannot be excluded.  If clinically indicated, further evaluation with dedicated views of the left forearm could be obtained.                                       X-Ray Foot Complete Bilateral (Final result)  Result time 05/25/23 16:32:05      Final result by Darío Caballero MD (05/25/23 16:32:05)                   Impression:      Minimally displaced intra-articular fracture involving the medial base of the proximal phalanx of the right great toe.      Electronically signed by: Darío Caballero MD  Date:    05/25/2023  Time:    16:32               Narrative:    EXAMINATION:  XR FOOT COMPLETE 3 VIEW BILATERAL    CLINICAL HISTORY:  Pain in right foot    TECHNIQUE:  AP, lateral, and oblique views of both feet were  performed.    COMPARISON:  None    FINDINGS:  There is a fracture involving the medial base of the proximal phalanx of the right great toe with intra-articular extension of the fracture line.  This is likely acute or subacute.  The remainder of the bones appear intact.  There is no evidence for dislocation.  No bony erosions are identified.  Soft tissues are unremarkable.

## 2023-05-26 NOTE — SUBJECTIVE & OBJECTIVE
Interval History: complains of R arm pain and wanting something to eat. Not talkative.     Review of Systems   Constitutional:  Negative for chills and fever.   Respiratory:  Negative for shortness of breath.    Cardiovascular:  Negative for chest pain.   Gastrointestinal:  Negative for abdominal pain.   Genitourinary:  Negative for difficulty urinating.   Musculoskeletal:  Positive for arthralgias.   Skin:  Positive for rash and wound.   Psychiatric/Behavioral:  Negative for confusion.    Objective:     Vital Signs (Most Recent):  Temp: 98.4 °F (36.9 °C) (05/26/23 0747)  Pulse: 71 (05/26/23 0747)  Resp: 19 (05/26/23 0747)  BP: 122/72 (05/26/23 0747)  SpO2: 98 % (05/26/23 0747) Vital Signs (24h Range):  Temp:  [97.8 °F (36.6 °C)-99.8 °F (37.7 °C)] 98.4 °F (36.9 °C)  Pulse:  [71-88] 71  Resp:  [18-20] 19  SpO2:  [94 %-99 %] 98 %  BP: (101-146)/(61-80) 122/72     Weight: 64.7 kg (142 lb 10.2 oz)  Body mass index is 19.35 kg/m².    Intake/Output Summary (Last 24 hours) at 5/26/2023 1025  Last data filed at 5/26/2023 1009  Gross per 24 hour   Intake 1068.84 ml   Output 1000 ml   Net 68.84 ml         Physical Exam  Vitals and nursing note reviewed.   Constitutional:       General: He is not in acute distress.     Appearance: He is not ill-appearing or toxic-appearing.   HENT:      Head: Normocephalic and atraumatic.      Nose: Nose normal.      Mouth/Throat:      Mouth: Mucous membranes are moist.   Eyes:      Conjunctiva/sclera: Conjunctivae normal.   Cardiovascular:      Rate and Rhythm: Normal rate and regular rhythm.   Pulmonary:      Effort: Pulmonary effort is normal.      Breath sounds: Normal breath sounds.   Abdominal:      General: Bowel sounds are normal.      Palpations: Abdomen is soft.   Musculoskeletal:      Comments: R arm swelling, warm to touch, tender to palpation   Skin:     General: Skin is warm and dry.   Neurological:      Mental Status: He is alert. Mental status is at baseline.   Psychiatric:       Comments: Poverty of speech. Poor eye contact           Significant Labs: All pertinent labs within the past 24 hours have been reviewed.    Significant Imaging: I have reviewed all pertinent imaging results/findings within the past 24 hours.

## 2023-05-26 NOTE — H&P
[facility] Medicine  History & Physical    Patient Name: Gisela Guerrero Jr.  MRN: [id number]  Patient Class: IP- Inpatient  Admission Date: 5/25/2023  Attending Physician: Kayode Georges MD   Primary Care Provider: Primary Doctor No         Patient information was obtained from patient, past medical records and ER records.     Subjective:     Principal Problem:Arm abscess    Chief Complaint:   Chief Complaint   Patient presents with    Insect Bite     EMS called out to facility for a homeless male seeing treatment for a bug bite to L arm.    Abscess        HPI: Gisela Guerrero Jr. 42 y.o. male with HIV, PTSD, THC use presents to the hospital with a chief complaint of arm pain and foot pain.  He reports 2 weeks of constant progressive right toe pain.  He attributes this pain due to frequent ambulation and denies any trauma to the foot.  He presented to the hospital today due to left arm swelling which he describes as a heavy pressure that is improved with incision and drainage in the emergency room.  He denies any IV drug use and reports swelling began initially as a bug bite.  He reports he is intermittently compliant with home Biktarvy sometimes taking daily and sometimes weekly.  He denies any recent antibiotic use.  He denies fever chest pain shortness of breath nausea vomiting abdominal pain hematuria hematemesis.    Afebrile without leukocytosis x-ray foot with Minimally displaced intra-articular fracture involving the medial base of the proximal phalanx of the right great toe extra humerus with 4 mm lucency projecting over the soft tissues of the left upper arm at the level of the mid humerus.  And a Linear metallic density projecting over the proximal left forearm measuring approximately 7 mm in length.  CRP elevated lactic acid elevated.      No past medical history on file.    No past surgical history on file.    Review of patient's allergies indicates:  No Known Allergies    No current  facility-administered medications on file prior to encounter.     Current Outpatient Medications on File Prior to Encounter   Medication Sig    amitriptyline (ELAVIL) 50 MG tablet Take 1 tablet by mouth 2 (two) times a day.    BIKTARVY -25 mg (25 kg or greater) Take 1 tablet by mouth Daily.    sertraline (ZOLOFT) 100 MG tablet Take 100 mg by mouth Daily.     Family History    None       Tobacco Use    Smoking status: Not on file    Smokeless tobacco: Not on file   Substance and Sexual Activity    Alcohol use: Not on file    Drug use: Not on file    Sexual activity: Not on file     Review of Systems  Review of Systems   Constitutional:  Negative for chills and fever.   HENT:  Negative for nosebleeds.    Eyes:  Negative for blurred vision and double vision.   Cardiovascular:  Negative for chest pain and orthopnea.   Gastrointestinal:  Negative for nausea and vomiting.   Genitourinary:  Negative for frequency.   Musculoskeletal:  Positive for myalgias.   Neurological:  Negative for tremors and weakness.     Objective:     Vital Signs (Most Recent):  Temp: 98 °F (36.7 °C) (05/25/23 1512)  Pulse: 88 (05/25/23 1512)  Resp: 18 (05/25/23 1631)  BP: (!) 146/80 (05/25/23 1512)  SpO2: 98 % (05/25/23 1512) Vital Signs (24h Range):  Temp:  [98 °F (36.7 °C)] 98 °F (36.7 °C)  Pulse:  [88] 88  Resp:  [18] 18  SpO2:  [98 %] 98 %  BP: (146)/(80) 146/80     Weight: 72.6 kg (160 lb)  Body mass index is 21.7 kg/m².     Physical Exam   Physical Exam  Constitutional:       Appearance: Normal appearance. He is not diaphoretic.   HENT:      Head: Normocephalic and atraumatic.      Right Ear: External ear normal.      Left Ear: External ear normal.      Mouth/Throat:      Mouth: Mucous membranes are moist.   Eyes:      Conjunctiva/sclera: Conjunctivae normal.   Cardiovascular:      Rate and Rhythm: Normal rate and regular rhythm.   Pulmonary:      Effort: Pulmonary effort is normal.      Breath sounds: Normal breath sounds.    Abdominal:      General: Bowel sounds are normal.      Palpations: Abdomen is soft.   Musculoskeletal:         General: Swelling present.      Cervical back: Normal range of motion and neck supple.      Comments: Left arm wrapped at bicep from previous I&D   Skin:     General: Skin is warm and dry.      Findings: Lesion present.   Neurological:      Mental Status: He is alert and oriented to person, place, and time.   Psychiatric:         Mood and Affect: Mood normal.         Behavior: Behavior normal.          Significant Labs: CBC:   Recent Labs   Lab 05/25/23  1604   WBC 9.52   HGB 13.2*   HCT 40.1   PLT SEE COMMENT     CMP:   Recent Labs   Lab 05/25/23  1604   *   K 4.6      CO2 21*      BUN 17   CREATININE 1.1   CALCIUM 9.2   PROT 8.1   ALBUMIN 3.3*   BILITOT 0.4   ALKPHOS 104   AST 28   ALT 19   ANIONGAP 13     Lactic Acid:   Recent Labs   Lab 05/25/23  1604   LACTATE 2.9*       Significant Imaging:   Imaging Results               X-Ray Humerus 2 View Left (Final result)  Result time 05/25/23 16:30:04      Final result by Darío Caballero MD (05/25/23 16:30:04)                   Impression:      No evidence for acute fracture, bone destruction, or dislocation.    4 mm lucency projecting over the soft tissues of the left upper arm at the level of the mid humerus.  In this patient with history of bone bite, this may be related to skin puncture although small abscess cannot be excluded.    Linear metallic density projecting over the proximal left forearm measuring approximately 7 mm in length.  A metallic soft tissue foreign body cannot be excluded.    This report was flagged in Epic as abnormal.      Electronically signed by: Darío Caballero MD  Date:    05/25/2023  Time:    16:30               Narrative:    EXAMINATION:  XR HUMERUS 2 VIEW LEFT    CLINICAL HISTORY:  Pain in left arm    TECHNIQUE:  Two views of the left humerus were obtained.    COMPARISON:  None    FINDINGS:  The left humerus  appears intact.  There is no evidence for acute fracture or bone destruction.  There is a rounded lucency measuring approximately 4 mm in size projecting over the soft tissues of the left upper arm at approximately the level of the mid humerus.  This may represent a small air bubble within the soft tissues in this patient with history of bug bite.  Small abscess cavity cannot be completely excluded.  On 1 of the views, there is a linear metallic density projecting over the proximal left forearm.  A soft tissue foreign body cannot be excluded.  If clinically indicated, further evaluation with dedicated views of the left forearm could be obtained.                                       X-Ray Foot Complete Bilateral (Final result)  Result time 05/25/23 16:32:05      Final result by Darío Caballero MD (05/25/23 16:32:05)                   Impression:      Minimally displaced intra-articular fracture involving the medial base of the proximal phalanx of the right great toe.      Electronically signed by: Darío Caballero MD  Date:    05/25/2023  Time:    16:32               Narrative:    EXAMINATION:  XR FOOT COMPLETE 3 VIEW BILATERAL    CLINICAL HISTORY:  Pain in right foot    TECHNIQUE:  AP, lateral, and oblique views of both feet were performed.    COMPARISON:  None    FINDINGS:  There is a fracture involving the medial base of the proximal phalanx of the right great toe with intra-articular extension of the fracture line.  This is likely acute or subacute.  The remainder of the bones appear intact.  There is no evidence for dislocation.  No bony erosions are identified.  Soft tissues are unremarkable.                                        Assessment/Plan:     * Arm abscess  Presents with abscess of arm denies IVDU though metallic foreign body on x-ray. X-ray arm without evidence of fracture though 4mm lucency possibly skin puncture, and linear metalic density over left forearm.   Started on zosyn/vanc  Blood cultures  pending  Will add wound culture  Ortho consulted  Repeat lactic acid    PTSD (post-traumatic stress disorder)  Continue home zoloft and elevail    Tetrahydrocannabinol (THC) use disorder, mild, abuse  Counseled on cessation. Will check UDS    Displaced fracture of distal phalanx of right great toe, initial encounter for closed fracture  Complained of toe pain in the ED, x-ray with Minimally displaced intra-articular fracture involving the medial base of the proximal phalanx of the right great toe  Placed in post op shoe in ED  Ortho consulted    HIV disease  He reports a history of HIV disease  On biktarvy, will resume though unclear as to compliance he states daily but then reports sometimes weekly compliance. Check HIV RNA and CD4 count      This patient in known to have HIV+ status (Have detected HIV PCR but never CD4 <200 or AIDS defining illness). Labs reviewed- No results found for: CD4, No results found for: HIVDNAPCR.       VTE Risk Mitigation (From admission, onward)           Ordered     IP VTE LOW RISK PATIENT  Once         05/25/23 1856     Place sequential compression device  Until discontinued         05/25/23 1856                  Discussed with ED physician.    VTE: SCD  Code: full  Diet: regular  Dispo: pending ortho eval  As clarification, on 5/25/2023 patient should be admitted to inpatient services under my care in collaboration with Kayode Georges MD. Jaylen Bernabe PA-C  Department of Hospital Medicine  [facility] - Emergency Dept

## 2023-05-26 NOTE — ASSESSMENT & PLAN NOTE
Presents with abscess of arm denies IVDU though metallic foreign body on x-ray. X-ray arm without evidence of fracture though 4mm lucency possibly skin puncture, and linear metalic density over left forearm.   Started on zosyn/vanc  Blood cultures pending  Will add wound culture  Ortho consulted  Repeat lactic acid

## 2023-05-26 NOTE — CONSULTS
Consult received this Am at 9:15  43 y/o man with Hx of HIV with a several day hx of LUE swelling. Elevated CRP and procalcitonin, no leukocytosis.  Hx is difficult to obtain as patient is a bit uncooperative.   He had an I&D of his arm in ED yesterday.      ROS difficult to obtain as above    No past medical history on file.   No past surgical history on file.    PE:  Aao fully  Grossly NVI LUE  Coban removed from LUE  LUE elevated  Diffuse LUE swelling worse over priximal arm  Pt does nto allow further exam  Packing seen on proximal medial arm with some drainage  Warm well perfused arm    Imaging reviewed no fracture, radiopaque foreign body seen in the proximal forearm    43 y/o man with left upper extremity cellulitis with  some  concer n for possible abscess. Patient is difficult to examine. Coban removed from upper arm as this could contribute to swelling of LUE, arm elevated throughout the night  Cont IV abx    Please keep NPO for possible surgery will await CT w contrast as pt has metallic fb in forearm will avoid MRI        MD Ada  [facility]

## 2023-05-26 NOTE — HOSPITAL COURSE
Mr Gisela Guerrero . Was admitted with left arm abscess. Started antibiotics. Ortho consulted. CT with 4.4x3.6x8cm abscess in biceps muscle. Suspect this is associated with IVDU rather than bug bite. S/p I&D by Ortho on 5/27/23. Gram stain with many GPC, culture no growth. Drain pulled on 5/28. Stable for discharge to home. Follow up with Ortho. Bactrim prescribed to complete 10 days.

## 2023-05-26 NOTE — PROGRESS NOTES
VANCOMYCIN DOSING BY PHARMACY DISCONTINUATION NOTE    Gisela Guerrero Jr. is a 42 y.o. male who had been consulted for vancomycin dosing.    The pharmacy consult for vancomycin dosing has been discontinued.     Vancomycin Dosing by Pharmacy Consult will sign-off. Please reconsult if necessary. Thank you for allowing us to participate in this patient's care.       Emilie Staley, PharmD  [phone]

## 2023-05-26 NOTE — PROGRESS NOTES
Pharmacokinetic Initial Assessment: IV Vancomycin    Assessment/Plan:    Initiate intravenous vancomycin with loading dose of 1000 mg once followed by a maintenance dose of vancomycin 1000 mg IV every 12 hours  Desired empiric serum trough concentration is 10 to 20 mcg/mL  Draw vancomycin trough level 60 min prior to fourth dose on 5/27/23 at approximately 0500  Pharmacy will continue to follow and monitor vancomycin.      Please contact pharmacy at extension [phone] with any questions regarding this assessment.     Thank you for the consult,   Isaak Waggoner       Patient brief summary:  Gisela Guerrero Jr. is a 42 y.o. male initiated on antimicrobial therapy with IV Vancomycin for treatment of suspected skin & soft tissue infection    Drug Allergies:   Review of patient's allergies indicates:  No Known Allergies    Actual Body Weight:   72.6 kg    Renal Function:   Estimated Creatinine Clearance: 91.7 mL/min (based on SCr of 1.1 mg/dL).,     Dialysis Method (if applicable):  N/A    CBC (last 72 hours):  Recent Labs   Lab Result Units 05/25/23  1604   WBC K/uL 9.52   Hemoglobin g/dL 13.2*   Hematocrit % 40.1   Platelets K/uL SEE COMMENT   Gran % % 81.9*   Lymph % % 8.1*   Mono % % 6.0   Eosinophil % % 2.9   Basophil % % 0.3   Differential Method  Automated       Metabolic Panel (last 72 hours):  Recent Labs   Lab Result Units 05/25/23  1604   Sodium mmol/L 135*   Potassium mmol/L 4.6   Chloride mmol/L 101   CO2 mmol/L 21*   Glucose mg/dL 100   BUN mg/dL 17   Creatinine mg/dL 1.1   Albumin g/dL 3.3*   Total Bilirubin mg/dL 0.4   Alkaline Phosphatase U/L 104   AST U/L 28   ALT U/L 19       Drug levels (last 3 results):  No results for input(s): VANCOMYCINRA, VANCORANDOM, VANCOMYCINPE, VANCOPEAK, VANCOMYCINTR, VANCOTROUGH in the last 72 hours.    Microbiologic Results:  Microbiology Results (last 7 days)       Procedure Component Value Units Date/Time    Aerobic culture [[id number]]     Order Status: No result  Specimen: Abscess from Arm, Right     Blood Culture #2 **CANNOT BE ORDERED STAT** [[id number]] Collected: 05/25/23 1608    Order Status: Sent Specimen: Blood from Peripheral, Hand, Right Updated: 05/25/23 1617    Blood Culture #1 **CANNOT BE ORDERED STAT** [[id number]] Collected: 05/25/23 1605    Order Status: Sent Specimen: Blood from Peripheral, Forearm, Right Updated: 05/25/23 1615

## 2023-05-26 NOTE — HPI
Gisela Guerrero Jr. 42 y.o. male with HIV, PTSD, THC use presents to the hospital with a chief complaint of arm pain and foot pain.  He reports 2 weeks of constant progressive right toe pain.  He attributes this pain due to frequent ambulation and denies any trauma to the foot.  He presented to the hospital today due to left arm swelling which he describes as a heavy pressure that is improved with incision and drainage in the emergency room.  He denies any IV drug use and reports swelling began initially as a bug bite.  He reports he is intermittently compliant with home Biktarvy sometimes taking daily and sometimes weekly.  He denies any recent antibiotic use.  He denies fever chest pain shortness of breath nausea vomiting abdominal pain hematuria hematemesis.    Afebrile without leukocytosis x-ray foot with Minimally displaced intra-articular fracture involving the medial base of the proximal phalanx of the right great toe extra humerus with 4 mm lucency projecting over the soft tissues of the left upper arm at the level of the mid humerus.  And a Linear metallic density projecting over the proximal left forearm measuring approximately 7 mm in length.  CRP elevated lactic acid elevated.

## 2023-05-27 ENCOUNTER — ANESTHESIA (OUTPATIENT)
Dept: SURGERY | Facility: HOSPITAL | Age: 43
DRG: 502 | End: 2023-05-27
Payer: MEDICAID

## 2023-05-27 LAB
ANION GAP SERPL CALC-SCNC: 8 MMOL/L (ref 8–16)
BUN SERPL-MCNC: 13 MG/DL (ref 6–20)
CALCIUM SERPL-MCNC: 9 MG/DL (ref 8.7–10.5)
CHLORIDE SERPL-SCNC: 100 MMOL/L (ref 95–110)
CO2 SERPL-SCNC: 27 MMOL/L (ref 23–29)
CREAT SERPL-MCNC: 0.9 MG/DL (ref 0.5–1.4)
EST. GFR  (NO RACE VARIABLE): >60 ML/MIN/1.73 M^2
GLUCOSE SERPL-MCNC: 104 MG/DL (ref 70–110)
GRAM STN SPEC: NORMAL
GRAM STN SPEC: NORMAL
POTASSIUM SERPL-SCNC: 4.1 MMOL/L (ref 3.5–5.1)
SODIUM SERPL-SCNC: 135 MMOL/L (ref 136–145)
VANCOMYCIN TROUGH SERPL-MCNC: 5.4 UG/ML (ref 10–22)

## 2023-05-27 PROCEDURE — 36000704 HC OR TIME LEV I 1ST 15 MIN: Performed by: ORTHOPAEDIC SURGERY

## 2023-05-27 PROCEDURE — 37000008 HC ANESTHESIA 1ST 15 MINUTES: Performed by: ORTHOPAEDIC SURGERY

## 2023-05-27 PROCEDURE — 87102 FUNGUS ISOLATION CULTURE: CPT | Performed by: ORTHOPAEDIC SURGERY

## 2023-05-27 PROCEDURE — 63600175 PHARM REV CODE 636 W HCPCS: Performed by: ANESTHESIOLOGY

## 2023-05-27 PROCEDURE — 25000003 PHARM REV CODE 250: Performed by: PHYSICIAN ASSISTANT

## 2023-05-27 PROCEDURE — D9220A PRA ANESTHESIA: ICD-10-PCS | Mod: ANES,,, | Performed by: ANESTHESIOLOGY

## 2023-05-27 PROCEDURE — 63600175 PHARM REV CODE 636 W HCPCS: Performed by: EMERGENCY MEDICINE

## 2023-05-27 PROCEDURE — 87075 CULTR BACTERIA EXCEPT BLOOD: CPT | Performed by: ORTHOPAEDIC SURGERY

## 2023-05-27 PROCEDURE — 87206 SMEAR FLUORESCENT/ACID STAI: CPT | Performed by: ORTHOPAEDIC SURGERY

## 2023-05-27 PROCEDURE — 25000003 PHARM REV CODE 250: Performed by: ORTHOPAEDIC SURGERY

## 2023-05-27 PROCEDURE — 87070 CULTURE OTHR SPECIMN AEROBIC: CPT | Performed by: ORTHOPAEDIC SURGERY

## 2023-05-27 PROCEDURE — 25000003 PHARM REV CODE 250: Performed by: NURSE ANESTHETIST, CERTIFIED REGISTERED

## 2023-05-27 PROCEDURE — 37000009 HC ANESTHESIA EA ADD 15 MINS: Performed by: ORTHOPAEDIC SURGERY

## 2023-05-27 PROCEDURE — 27200651 HC AIRWAY, LMA: Performed by: ANESTHESIOLOGY

## 2023-05-27 PROCEDURE — 80048 BASIC METABOLIC PNL TOTAL CA: CPT | Performed by: HOSPITALIST

## 2023-05-27 PROCEDURE — 71000033 HC RECOVERY, INTIAL HOUR: Performed by: ORTHOPAEDIC SURGERY

## 2023-05-27 PROCEDURE — 36000705 HC OR TIME LEV I EA ADD 15 MIN: Performed by: ORTHOPAEDIC SURGERY

## 2023-05-27 PROCEDURE — 25000003 PHARM REV CODE 250: Performed by: HOSPITALIST

## 2023-05-27 PROCEDURE — 63600175 PHARM REV CODE 636 W HCPCS: Performed by: ORTHOPAEDIC SURGERY

## 2023-05-27 PROCEDURE — 63600175 PHARM REV CODE 636 W HCPCS: Performed by: HOSPITALIST

## 2023-05-27 PROCEDURE — 71000039 HC RECOVERY, EACH ADD'L HOUR: Performed by: ORTHOPAEDIC SURGERY

## 2023-05-27 PROCEDURE — 63600175 PHARM REV CODE 636 W HCPCS: Performed by: PHYSICIAN ASSISTANT

## 2023-05-27 PROCEDURE — D9220A PRA ANESTHESIA: Mod: ANES,,, | Performed by: ANESTHESIOLOGY

## 2023-05-27 PROCEDURE — 25000003 PHARM REV CODE 250: Performed by: EMERGENCY MEDICINE

## 2023-05-27 PROCEDURE — 87116 MYCOBACTERIA CULTURE: CPT | Performed by: ORTHOPAEDIC SURGERY

## 2023-05-27 PROCEDURE — 63600175 PHARM REV CODE 636 W HCPCS: Performed by: NURSE ANESTHETIST, CERTIFIED REGISTERED

## 2023-05-27 PROCEDURE — 80202 ASSAY OF VANCOMYCIN: CPT | Performed by: STUDENT IN AN ORGANIZED HEALTH CARE EDUCATION/TRAINING PROGRAM

## 2023-05-27 PROCEDURE — 25500020 PHARM REV CODE 255: Performed by: HOSPITALIST

## 2023-05-27 PROCEDURE — D9220A PRA ANESTHESIA: ICD-10-PCS | Mod: CRNA,,, | Performed by: NURSE ANESTHETIST, CERTIFIED REGISTERED

## 2023-05-27 PROCEDURE — 87205 SMEAR GRAM STAIN: CPT | Performed by: ORTHOPAEDIC SURGERY

## 2023-05-27 PROCEDURE — D9220A PRA ANESTHESIA: Mod: CRNA,,, | Performed by: NURSE ANESTHETIST, CERTIFIED REGISTERED

## 2023-05-27 PROCEDURE — 36415 COLL VENOUS BLD VENIPUNCTURE: CPT | Performed by: STUDENT IN AN ORGANIZED HEALTH CARE EDUCATION/TRAINING PROGRAM

## 2023-05-27 PROCEDURE — 11000001 HC ACUTE MED/SURG PRIVATE ROOM

## 2023-05-27 RX ORDER — SODIUM CHLORIDE 0.9 % (FLUSH) 0.9 %
10 SYRINGE (ML) INJECTION
Status: DISCONTINUED | OUTPATIENT
Start: 2023-05-27 | End: 2023-05-27

## 2023-05-27 RX ORDER — KETOROLAC TROMETHAMINE 30 MG/ML
INJECTION, SOLUTION INTRAMUSCULAR; INTRAVENOUS
Status: DISCONTINUED | OUTPATIENT
Start: 2023-05-27 | End: 2023-05-27

## 2023-05-27 RX ORDER — PROCHLORPERAZINE EDISYLATE 5 MG/ML
5 INJECTION INTRAMUSCULAR; INTRAVENOUS EVERY 30 MIN PRN
Status: DISCONTINUED | OUTPATIENT
Start: 2023-05-27 | End: 2023-05-27

## 2023-05-27 RX ORDER — HYDROMORPHONE HYDROCHLORIDE 1 MG/ML
0.5 INJECTION, SOLUTION INTRAMUSCULAR; INTRAVENOUS; SUBCUTANEOUS EVERY 6 HOURS PRN
Status: DISCONTINUED | OUTPATIENT
Start: 2023-05-27 | End: 2023-05-29 | Stop reason: HOSPADM

## 2023-05-27 RX ORDER — MIDAZOLAM HYDROCHLORIDE 1 MG/ML
INJECTION, SOLUTION INTRAMUSCULAR; INTRAVENOUS
Status: DISCONTINUED | OUTPATIENT
Start: 2023-05-27 | End: 2023-05-27

## 2023-05-27 RX ORDER — ONDANSETRON 2 MG/ML
INJECTION INTRAMUSCULAR; INTRAVENOUS
Status: DISCONTINUED | OUTPATIENT
Start: 2023-05-27 | End: 2023-05-27

## 2023-05-27 RX ORDER — ONDANSETRON 2 MG/ML
4 INJECTION INTRAMUSCULAR; INTRAVENOUS DAILY PRN
Status: DISCONTINUED | OUTPATIENT
Start: 2023-05-27 | End: 2023-05-27

## 2023-05-27 RX ORDER — ENOXAPARIN SODIUM 100 MG/ML
40 INJECTION SUBCUTANEOUS EVERY 24 HOURS
Status: DISCONTINUED | OUTPATIENT
Start: 2023-05-27 | End: 2023-05-29 | Stop reason: HOSPADM

## 2023-05-27 RX ORDER — FENTANYL CITRATE 50 UG/ML
INJECTION, SOLUTION INTRAMUSCULAR; INTRAVENOUS
Status: DISCONTINUED | OUTPATIENT
Start: 2023-05-27 | End: 2023-05-27

## 2023-05-27 RX ORDER — OXYCODONE AND ACETAMINOPHEN 10; 325 MG/1; MG/1
1 TABLET ORAL EVERY 6 HOURS PRN
Status: DISCONTINUED | OUTPATIENT
Start: 2023-05-27 | End: 2023-05-29 | Stop reason: HOSPADM

## 2023-05-27 RX ORDER — LIDOCAINE HYDROCHLORIDE 20 MG/ML
INJECTION INTRAVENOUS
Status: DISCONTINUED | OUTPATIENT
Start: 2023-05-27 | End: 2023-05-27

## 2023-05-27 RX ORDER — BUPIVACAINE HYDROCHLORIDE 2.5 MG/ML
INJECTION, SOLUTION INFILTRATION; PERINEURAL
Status: DISCONTINUED | OUTPATIENT
Start: 2023-05-27 | End: 2023-05-27 | Stop reason: HOSPADM

## 2023-05-27 RX ORDER — PROPOFOL 10 MG/ML
VIAL (ML) INTRAVENOUS
Status: DISCONTINUED | OUTPATIENT
Start: 2023-05-27 | End: 2023-05-27

## 2023-05-27 RX ORDER — HYDROMORPHONE HYDROCHLORIDE 2 MG/ML
0.2 INJECTION, SOLUTION INTRAMUSCULAR; INTRAVENOUS; SUBCUTANEOUS EVERY 5 MIN PRN
Status: DISCONTINUED | OUTPATIENT
Start: 2023-05-27 | End: 2023-05-27

## 2023-05-27 RX ADMIN — HYDROMORPHONE HYDROCHLORIDE 0.2 MG: 2 INJECTION INTRAMUSCULAR; INTRAVENOUS; SUBCUTANEOUS at 11:05

## 2023-05-27 RX ADMIN — SERTRALINE 100 MG: 50 TABLET, FILM COATED ORAL at 04:05

## 2023-05-27 RX ADMIN — PIPERACILLIN AND TAZOBACTAM 4.5 G: 4; .5 INJECTION, POWDER, LYOPHILIZED, FOR SOLUTION INTRAVENOUS; PARENTERAL at 10:05

## 2023-05-27 RX ADMIN — AMITRIPTYLINE HYDROCHLORIDE 50 MG: 25 TABLET, FILM COATED ORAL at 08:05

## 2023-05-27 RX ADMIN — HYDROMORPHONE HYDROCHLORIDE 0.2 MG: 2 INJECTION INTRAMUSCULAR; INTRAVENOUS; SUBCUTANEOUS at 10:05

## 2023-05-27 RX ADMIN — LIDOCAINE HYDROCHLORIDE 100 MG: 20 INJECTION, SOLUTION INTRAVENOUS at 09:05

## 2023-05-27 RX ADMIN — VANCOMYCIN HYDROCHLORIDE 2000 MG: 500 INJECTION, POWDER, LYOPHILIZED, FOR SOLUTION INTRAVENOUS at 08:05

## 2023-05-27 RX ADMIN — VANCOMYCIN HYDROCHLORIDE 1000 MG: 1 INJECTION, POWDER, LYOPHILIZED, FOR SOLUTION INTRAVENOUS at 08:05

## 2023-05-27 RX ADMIN — KETOROLAC TROMETHAMINE 30 MG: 30 INJECTION, SOLUTION INTRAMUSCULAR; INTRAVENOUS at 09:05

## 2023-05-27 RX ADMIN — PIPERACILLIN AND TAZOBACTAM 4.5 G: 4; .5 INJECTION, POWDER, LYOPHILIZED, FOR SOLUTION INTRAVENOUS; PARENTERAL at 11:05

## 2023-05-27 RX ADMIN — FENTANYL CITRATE 100 MCG: 0.05 INJECTION, SOLUTION INTRAMUSCULAR; INTRAVENOUS at 09:05

## 2023-05-27 RX ADMIN — MIDAZOLAM HYDROCHLORIDE 2 MG: 1 INJECTION, SOLUTION INTRAMUSCULAR; INTRAVENOUS at 09:05

## 2023-05-27 RX ADMIN — Medication 6 MG: at 08:05

## 2023-05-27 RX ADMIN — ENOXAPARIN SODIUM 40 MG: 40 INJECTION SUBCUTANEOUS at 04:05

## 2023-05-27 RX ADMIN — IOHEXOL 100 ML: 350 INJECTION, SOLUTION INTRAVENOUS at 12:05

## 2023-05-27 RX ADMIN — PROPOFOL 120 MG: 10 INJECTION, EMULSION INTRAVENOUS at 09:05

## 2023-05-27 RX ADMIN — BICTEGRAVIR SODIUM, EMTRICITABINE, AND TENOFOVIR ALAFENAMIDE FUMARATE 1 TABLET: 50; 200; 25 TABLET ORAL at 04:05

## 2023-05-27 RX ADMIN — ONDANSETRON 4 MG: 2 INJECTION, SOLUTION INTRAMUSCULAR; INTRAVENOUS at 09:05

## 2023-05-27 RX ADMIN — SODIUM CHLORIDE, SODIUM LACTATE, POTASSIUM CHLORIDE, AND CALCIUM CHLORIDE: .6; .31; .03; .02 INJECTION, SOLUTION INTRAVENOUS at 09:05

## 2023-05-27 RX ADMIN — OXYCODONE AND ACETAMINOPHEN 1 TABLET: 10; 325 TABLET ORAL at 08:05

## 2023-05-27 RX ADMIN — GLYCOPYRROLATE 0.1 MG: 0.2 INJECTION, SOLUTION INTRAMUSCULAR; INTRAVITREAL at 09:05

## 2023-05-27 NOTE — ASSESSMENT & PLAN NOTE
Complained of toe pain in the ED, x-ray with Minimally displaced intra-articular fracture involving the medial base of the proximal phalanx of the right great toe  - Place in post op shoe

## 2023-05-27 NOTE — ASSESSMENT & PLAN NOTE
Presents with abscess of left arm. X-ray arm without evidence of fracture though 4mm lucency possibly skin puncture, and linear metalic density over left forearm. Abscess drained in ED but cultures not collected from abscess. CT confirms abscess in biceps muscle.   - Started on zosyn/vanc  - Blood cultures NGTD  - Ortho consulted- s/p I&D on 5/27/23. Drain in place to be removed on Monday. Cultures sent- will follow up   - PRN pain Rx

## 2023-05-27 NOTE — NURSING
Dr. West with Ortho ordered I&D of Left Arm for abscess for tomorrow, 5/27/23,  and MRI of Forearm and Upper arm With and Without Contrast STAT to be done tonight to determine if surgery absolutely necessary versus IV abx treatment.  MRI notified at 1945 of stat orders.

## 2023-05-27 NOTE — PROGRESS NOTES
Patient has a  linear metallic density seen on forearm xrays done on 05/25/2023, when screening patient and inquiring about it patient states he has no idea what it could be. This is an issue in that the patient's arm will be directly on the coil and could possibly heat up and will cause a metallic void on the images. Claudy leyva was notified.

## 2023-05-27 NOTE — NURSING
Notified Dr West per Dr WESLEY Mccloud that patient has an abscess and that the MRI couldn't be done due to the foreign body being metallic. Aware and verbalized understanding

## 2023-05-27 NOTE — PROGRESS NOTES
[facility]  Adult Nutrition  Consult Note    SUMMARY     Recommendations    1. Recommend Boost to increase protein/caloric intake.   2. Recommend Rickie to promote wound healing.   3. Continue to monitor weight changes.   4. Collaboration with medical providers    Goals: 1. Meet % EEN/EPN by RD follow up  Nutrition Goal Status: new  Communication of RD Recs:  (POC)    Assessment and Plan    Nutrition Problem  Increased nutrient needs; protein    Related to (etiology):   Wound healing    Signs and Symptoms (as evidenced by):   Arm abscess    Interventions/Recommendations (treatment strategy):  Rickie BID  Collaboration with medical providers    Nutrition Diagnosis Status:   New     Reason for Assessment    Reason For Assessment: identified at risk by screening criteria  Diagnosis:  (arm abscess)  Relevant Medical History: no hx on file  Interdisciplinary Rounds: did not attend  General Information Comments: RD consult 2/2 malnutrition. Upon visit, pt did not want to answer any questions; NFPE not performed. Pt has no weight history in chart. BMI 19.35.  100% intake this afternoon. Double portioned regular diet. Continue to monitor PO intake. Recommend Boost Plus to increase caloric intake. Recommend Rickie to promote wound healing. RD to continue to monitor and follow up  Nutrition Discharge Planning: Regular Diet    Nutrition Risk Screen    Nutrition Risk Screen: no indicators present    Nutrition/Diet History    Food Allergies: NKFA    Anthropometrics    Temp: 99.2 °F (37.3 °C)  Height Method: Stated  Height: 6' (182.9 cm)  Height (inches): 72 in  Weight Method: Bed Scale  Weight: 64.4 kg (142 lb)  Weight (lb): 142 lb  Ideal Body Weight (IBW), Male: 178 lb  % Ideal Body Weight, Male (lb): 80.13 %  BMI (Calculated): 19.3  BMI Grade: 18.5-24.9 - normal       Lab/Procedures/Meds    Pertinent Labs Reviewed: reviewed  BMP  Lab Results   Component Value Date     (L) 05/26/2023    K 4.2 05/26/2023      05/26/2023    CO2 25 05/26/2023    BUN 14 05/26/2023    CREATININE 0.9 05/26/2023    CALCIUM 8.4 (L) 05/26/2023    ANIONGAP 7 (L) 05/26/2023    EGFRNORACEVR >60 05/26/2023      Pertinent Medications Reviewed: reviewed  Current Outpatient Medications   Medication Instructions    amitriptyline (ELAVIL) 50 MG tablet 1 tablet, Oral, 2 times daily    BIKTARVY -25 mg (25 kg or greater) 1 tablet, Oral, Daily    sertraline (ZOLOFT) 100 mg, Oral, Daily        Estimated/Assessed Needs    Weight Used For Calorie Calculations: 64.4 kg (142 lb)  Energy Calorie Requirements (kcal): 1910 kcal  Energy Need Method: Brunswick-St Jeor (1.2 g/kg)  Protein Requirements: 64 g (1 g/kg)  Weight Used For Protein Calculations: 64.4 kg (142 lb)        RDA Method (mL): 1910         Nutrition Prescription Ordered    Current Diet Order: Regular diet; double portions    Evaluation of Received Nutrient/Fluid Intake    Energy Calories Required: meeting needs  Protein Required: meeting needs  Fluid Required: meeting needs  Comments: lbm 5/24/23  % Intake of Estimated Energy Needs: 75 - 100 %  % Meal Intake: 75 - 100 %    Nutrition Risk    Level of Risk/Frequency of Follow-up: moderate       Monitor and Evaluation    Food and Nutrient Intake: food and beverage intake  Food and Nutrient Adminstration: diet order  Knowledge/Beliefs/Attitudes: food and nutrition knowledge/skill, beliefs and attitudes  Physical Activity and Function: nutrition-related ADLs and IADLs, factors affecting access to physical activity  Anthropometric Measurements: weight, weight change, body mass index  Biochemical Data, Medical Tests and Procedures: electrolyte and renal panel, gastrointestinal profile, glucose/endocrine profile, inflammatory profile, lipid profile  Nutrition-Focused Physical Findings: overall appearance       Nutrition Follow-Up    RD Follow-up?: Yes    Leona Powell, Registration Eligible, Provisional LDN

## 2023-05-27 NOTE — PLAN OF CARE
Called pt and left message to call pink team.  Amanda RANGEL CMA     NPO. IV antibiotics  as ordered. Left arm SHERLY purulent drainage elevated on 3 pillows covered with pad as ordered per Dr West. Continue with plan of care. No distress noted.   Problem: Adult Inpatient Plan of Care  Goal: Plan of Care Review  Outcome: Ongoing, Progressing  Goal: Patient-Specific Goal (Individualized)  Outcome: Ongoing, Progressing  Goal: Optimal Comfort and Wellbeing  Outcome: Ongoing, Progressing  Goal: Readiness for Transition of Care  Outcome: Ongoing, Progressing     Problem: Fall Injury Risk  Goal: Absence of Fall and Fall-Related Injury  Outcome: Ongoing, Progressing      Asc Procedure Text (C): After obtaining clear surgical margins the patient was sent to an ASC for surgical repair.  The patient understands they will receive post-surgical care and follow-up from the ASC physician.

## 2023-05-27 NOTE — PROGRESS NOTES
Discussed with radiology. Pt reportedly refused to go down for CT scan. Will re attempt.    MD Brett

## 2023-05-27 NOTE — PROGRESS NOTES
[facility] Medicine  Progress Note    Patient Name: Gisela Guerrero Jr.  MRN: [id number]  Patient Class: IP- Inpatient   Admission Date: 5/25/2023  Length of Stay: 2 days  Attending Physician: Lisa Houser MD  Primary Care Provider: Primary Doctor No        Subjective:     Principal Problem:Arm abscess        HPI:  Gisela Guerrero Jr. 42 y.o. male with HIV, PTSD, THC use presents to the hospital with a chief complaint of arm pain and foot pain.  He reports 2 weeks of constant progressive right toe pain.  He attributes this pain due to frequent ambulation and denies any trauma to the foot.  He presented to the hospital today due to left arm swelling which he describes as a heavy pressure that is improved with incision and drainage in the emergency room.  He denies any IV drug use and reports swelling began initially as a bug bite.  He reports he is intermittently compliant with home Biktarvy sometimes taking daily and sometimes weekly.  He denies any recent antibiotic use.  He denies fever chest pain shortness of breath nausea vomiting abdominal pain hematuria hematemesis.    Afebrile without leukocytosis x-ray foot with Minimally displaced intra-articular fracture involving the medial base of the proximal phalanx of the right great toe extra humerus with 4 mm lucency projecting over the soft tissues of the left upper arm at the level of the mid humerus.  And a Linear metallic density projecting over the proximal left forearm measuring approximately 7 mm in length.  CRP elevated lactic acid elevated.      Overview/Hospital Course:  Mr Gisela Guerrero Jr. Was admitted with left arm abscess. Started antibiotics. Ortho consulted. CT with 4.4x3.6x8cm abscess in biceps muscle. Suspect this is associated with IVDU rather than bug bite. S/p I&D by Ortho on 5/27/23.       Interval History: R arm pain present.     Review of Systems   Constitutional:  Negative for chills and fever.   Respiratory:  Negative for  shortness of breath.    Cardiovascular:  Negative for chest pain.   Gastrointestinal:  Negative for abdominal pain.   Genitourinary:  Negative for difficulty urinating.   Musculoskeletal:  Positive for myalgias. Negative for arthralgias.   Skin:  Positive for rash and wound.   Psychiatric/Behavioral:  Negative for confusion.    Objective:     Vital Signs (Most Recent):  Temp: 99 °F (37.2 °C) (05/27/23 1015)  Pulse: 94 (05/27/23 1015)  Resp: (!) 21 (05/27/23 1102)  BP: (!) 141/100 (05/27/23 1015)  SpO2: 100 % (FM) (05/27/23 1015) Vital Signs (24h Range):  Temp:  [97.5 °F (36.4 °C)-99.9 °F (37.7 °C)] 99 °F (37.2 °C)  Pulse:  [72-94] 94  Resp:  [16-21] 21  SpO2:  [97 %-100 %] 100 %  BP: (117-147)/() 141/100     Weight: 64.4 kg (142 lb)  Body mass index is 19.26 kg/m².    Intake/Output Summary (Last 24 hours) at 5/27/2023 1118  Last data filed at 5/27/2023 1009  Gross per 24 hour   Intake 1630 ml   Output 1920 ml   Net -290 ml           Physical Exam  Vitals and nursing note reviewed.   Constitutional:       General: He is not in acute distress.     Appearance: He is not ill-appearing or toxic-appearing.   HENT:      Head: Normocephalic and atraumatic.      Nose: Nose normal.      Mouth/Throat:      Mouth: Mucous membranes are moist.   Eyes:      Conjunctiva/sclera: Conjunctivae normal.   Cardiovascular:      Rate and Rhythm: Normal rate and regular rhythm.   Pulmonary:      Effort: Pulmonary effort is normal.      Breath sounds: Normal breath sounds.   Abdominal:      General: Bowel sounds are normal.      Palpations: Abdomen is soft.   Musculoskeletal:      Comments: R arm swelling, warm to touch, tender to palpation   Skin:     General: Skin is warm and dry.   Neurological:      Mental Status: He is alert. Mental status is at baseline.           Significant Labs: All pertinent labs within the past 24 hours have been reviewed.    Significant Imaging: I have reviewed all pertinent imaging results/findings within  the past 24 hours.      Assessment/Plan:      * Arm abscess  Presents with abscess of left arm. X-ray arm without evidence of fracture though 4mm lucency possibly skin puncture, and linear metalic density over left forearm. Abscess drained in ED but cultures not collected from abscess. CT confirms abscess in biceps muscle.   - Started on zosyn/vanc  - Blood cultures NGTD  - Ortho consulted- s/p I&D on 5/27/23. Drain in place to be removed on Monday. Cultures sent- will follow up   - PRN pain Rx    PTSD (post-traumatic stress disorder)  Continue home zoloft and elevail    Tetrahydrocannabinol (THC) use disorder, mild, abuse  Counseled on cessation.     Displaced fracture of distal phalanx of right great toe, initial encounter for closed fracture  Complained of toe pain in the ED, x-ray with Minimally displaced intra-articular fracture involving the medial base of the proximal phalanx of the right great toe  - Place in post op shoe      Asymptomatic HIV infection  He reports a history of HIV positive status. On biktarvy, will resume though unclear as to compliance he states daily but then reports sometimes weekly compliance. No prior labs to review  - last CD4 377/42%-- normal immune system  - at this time, no signs of HIV disease        VTE Risk Mitigation (From admission, onward)         Ordered     IP VTE LOW RISK PATIENT  Once         05/25/23 1856     Place sequential compression device  Until discontinued         05/25/23 1856                Discharge Planning   PETER: 5/30/2023     Code Status: Full Code   Is the patient medically ready for discharge?:     Reason for patient still in hospital (select all that apply): Patient trending condition  Discharge Plan A: Home                  Lisa Houser MD  Department of Hospital Medicine   [facility]

## 2023-05-27 NOTE — ANESTHESIA PREPROCEDURE EVALUATION
05/27/2023  Gisela Guerrero Jr. is a 42 y.o., male.  To undergo Procedure(s) (LRB):  INCISION AND DRAINAGE, UPPER EXTREMITY (Left)     Denies CP/SOB/GERD/MI/CVA/URI symptoms.  METS > 4  NPO > 8    Past Medical History:  No past medical history on file.    Past Surgical History:  No past surgical history on file.    Social History:  Social History     Socioeconomic History    Marital status: Single       Medications:  No current facility-administered medications on file prior to encounter.     Current Outpatient Medications on File Prior to Encounter   Medication Sig Dispense Refill    amitriptyline (ELAVIL) 50 MG tablet Take 1 tablet by mouth 2 (two) times a day.      BIKTARVY -25 mg (25 kg or greater) Take 1 tablet by mouth Daily.      sertraline (ZOLOFT) 100 MG tablet Take 100 mg by mouth Daily.         Allergies:  Review of patient's allergies indicates:  No Known Allergies    Active Problems:  Patient Active Problem List   Diagnosis    Arm abscess    Asymptomatic HIV infection    Displaced fracture of distal phalanx of right great toe, initial encounter for closed fracture    Tetrahydrocannabinol (THC) use disorder, mild, abuse    PTSD (post-traumatic stress disorder)       Diagnostic Studies:   Latest Reference Range & Units 05/26/23 03:52   WBC 3.90 - 12.70 K/uL 7.89   RBC 4.60 - 6.20 M/uL 3.93 (L)   Hemoglobin 14.0 - 18.0 g/dL 10.8 (L)   Hematocrit 40.0 - 54.0 % 34.6 (L)   MCV 82 - 98 fL 88   MCH 27.0 - 31.0 pg 27.5   MCHC 32.0 - 36.0 g/dL 31.2 (L)   RDW 11.5 - 14.5 % 13.1   Platelets 150 - 450 K/uL 254   MPV 9.2 - 12.9 fL 9.3      Latest Reference Range & Units 05/26/23 03:52   Sodium 136 - 145 mmol/L 135 (L)   Potassium 3.5 - 5.1 mmol/L 4.2   Chloride 95 - 110 mmol/L 103   CO2 23 - 29 mmol/L 25   Anion Gap 8 - 16 mmol/L 7 (L)   BUN 6 - 20 mg/dL 14   Creatinine 0.5 - 1.4 mg/dL 0.9   eGFR >60 mL/min/1.73 m^2 >60   Glucose 70 - 110 mg/dL 114 (H)   Calcium 8.7 - 10.5 mg/dL 8.4 (L)   Alkaline  Phosphatase 55 - 135 U/L 81   PROTEIN TOTAL 6.0 - 8.4 g/dL 6.3   Albumin 3.5 - 5.2 g/dL 2.5 (L)   BILIRUBIN TOTAL 0.1 - 1.0 mg/dL 0.4   AST 10 - 40 U/L 15   ALT 10 - 44 U/L 17     24 Hour Vitals:  Temp:  [36.4 °C (97.6 °F)-37.7 °C (99.9 °F)] 36.4 °C (97.6 °F)  Pulse:  [71-87] 81  Resp:  [16-20] 18  SpO2:  [97 %-99 %] 99 %  BP: (117-140)/(67-88) 137/88   See Nursing Charting For Additional Vitals    Pre-op Assessment    I have reviewed the Patient Summary Reports.     I have reviewed the Nursing Notes.       Review of Systems  Anesthesia Hx:  No previous Anesthesia    Social:  marijuana   Hematology/Oncology:         -- Anemia: Hematology Comments: HIV+   Cardiovascular:  Cardiovascular Normal Exercise tolerance: good     Pulmonary:  Pulmonary Normal    Renal/:  Renal/ Normal     Hepatic/GI:  Hepatic/GI Normal    Musculoskeletal:   Left arm abscess    Fracture of right toe   Neurological:  Neurology Normal    Endocrine:  Endocrine Normal    Psych:   Psychiatric History PTSD         Physical Exam  General: Well nourished, Cooperative, Alert and Oriented    Airway:  Mallampati: II   Mouth Opening: Normal  TM Distance: Normal      Dental:    Chest/Lungs:  Normal Respiratory Rate, Clear to auscultation    Heart:  Rate: Normal  Rhythm: Regular Rhythm        Anesthesia Plan  Type of Anesthesia, risks & benefits discussed:    Anesthesia Type: Gen Supraglottic Airway, Gen ETT  Intra-op Monitoring Plan: Standard ASA Monitors  Post Op Pain Control Plan: multimodal analgesia and IV/PO Opioids PRN  Induction:  IV  Informed Consent: Informed consent signed with the Patient and all parties understand the risks and agree with anesthesia plan.  All questions answered.   ASA Score: 3  Anesthesia Plan Notes:   GA with LMA  Standard ASA monitors  Recovery in PACU  PONV: 2    Ready For Surgery From Anesthesia Perspective.     .

## 2023-05-27 NOTE — NURSING
Off floor via bed to surgery, no distress noted, IV vancomycin infusing,  IV zosyn dose sent with patient per PACU nurse request.

## 2023-05-27 NOTE — TRANSFER OF CARE
Anesthesia Transfer of Care Note    Patient: Gisela Guerrero Jr.    Procedure(s) Performed: Procedure(s) (LRB):  INCISION AND DRAINAGE, UPPER EXTREMITY (Left)    Patient location: PACU    Anesthesia Type: general    Transport from OR: Transported from OR on room air with adequate spontaneous ventilation    Post pain: adequate analgesia    Post assessment: no apparent anesthetic complications and tolerated procedure well    Post vital signs: stable    Level of consciousness: sedated    Nausea/Vomiting: no nausea/vomiting    Complications: none    Transfer of care protocol was followed      Last vitals:   Visit Vitals  BP (!) 141/100 (BP Location: Right arm, Patient Position: Lying)   Pulse 94   Temp 37.2 °C (99 °F) (Temporal)   Resp 18   Ht 6' (1.829 m)   Wt 64.4 kg (142 lb)   SpO2 100%   BMI 19.26 kg/m²

## 2023-05-27 NOTE — PROGRESS NOTES
Patient seen examined at bedside this morning.  No acute events overnight per nurse.      Physical exam:  Unchanged     Assessment and plan:  42-year-old male with history of HIV with an abscess in his left upper extremity seen on CT scan.  Had extensive conversation with the patient currently risks benefits and alternatives as anticipated convalescence of formal I&D.  He is aware that he will likely have a drain or packing afterwards and discussed with him the risks of leaving against medical advice with packing or drain in place.  Informed consent signed for I&D of left biceps.  Informed him he does have a foreign body in his left forearm likely his antecubital fossa seen on x-ray however there are no overlying signs of infection in this foreign body likely a needle has no overlying skin changes concerning for acute infection.  Informed consent signed for I&D of upper arm.      Surgical site marked to OR     Jassi West MD  [facility]

## 2023-05-27 NOTE — ANESTHESIA PROCEDURE NOTES
Intubation    Date/Time: 5/27/2023 9:24 AM  Performed by: Jayson Comer CRNA  Authorized by: Jaylen Mann MD     Intubation:     Induction:  Intravenous    Intubated:  Postinduction    Mask Ventilation:  Easy mask    Attempts:  1    Attempted By:  CRNA    Difficult Airway Encountered?: No      Complications:  None    Airway Device:  Supraglottic airway/LMA    Airway Device Size:  4.0    Placement Verified By:  Capnometry    Complicating Factors:  None    Findings Post-Intubation:  BS equal bilateral and atraumatic/condition of teeth unchanged

## 2023-05-27 NOTE — PROGRESS NOTES
Pharmacokinetic Assessment Follow Up: IV Vancomycin    Vancomycin serum concentration assessment(s):    The trough level was drawn correctly and can be used to guide therapy at this time. The measurement is below the desired definitive target range of 10 to 20 mcg/mL.    Vancomycin Regimen Plan:    Change regimen to Vancomycin 2000 mg IV every 12 hours with next serum trough concentration measured at 30 minutes prior to 4th dose on 5/29    Drug levels (last 3 results):  Recent Labs   Lab Result Units 05/27/23  0706   Vancomycin-Trough ug/mL 5.4*       Pharmacy will continue to follow and monitor vancomycin.    Please contact pharmacy at extension [phone] for questions regarding this assessment.    Thank you for the consult,   Joon Caballero       Patient brief summary:  Gisela Guerrero Jr. is a 42 y.o. male initiated on antimicrobial therapy with IV Vancomycin for treatment of skin & soft tissue infection    The patient's current regimen is 2000 mg IV every 12 hours     Drug Allergies:   Review of patient's allergies indicates:  No Known Allergies    Actual Body Weight:   64.4 kg    Renal Function:   Estimated Creatinine Clearance: 99.4 mL/min (based on SCr of 0.9 mg/dL).,     Dialysis Method (if applicable):  N/A    CBC (last 72 hours):  Recent Labs   Lab Result Units 05/25/23  1604 05/26/23  0352   WBC K/uL 9.52 7.89   Hemoglobin g/dL 13.2* 10.8*   Hematocrit % 40.1 34.6*   Platelets K/uL SEE COMMENT 254   Gran % % 81.9* 75.8*   Lymph % % 8.1* 14.1*   Mono % % 6.0 8.7   Eosinophil % % 2.9 0.6   Basophil % % 0.3 0.4   Differential Method  Automated Automated       Metabolic Panel (last 72 hours):  Recent Labs   Lab Result Units 05/25/23  1604 05/26/23  0352 05/27/23  0706   Sodium mmol/L 135* 135* 135*   Potassium mmol/L 4.6 4.2 4.1   Chloride mmol/L 101 103 100   CO2 mmol/L 21* 25 27   Glucose mg/dL 100 114* 104   BUN mg/dL 17 14 13   Creatinine mg/dL 1.1 0.9 0.9   Albumin g/dL 3.3* 2.5*  --    Total Bilirubin mg/dL 0.4  0.4  --    Alkaline Phosphatase U/L 104 81  --    AST U/L 28 15  --    ALT U/L 19 17  --        Vancomycin Administrations:  vancomycin given in the last 96 hours                     vancomycin (VANCOCIN) 1,000 mg in dextrose 5 % (D5W) 250 mL IVPB (Vial-Mate) (mg) 1,000 mg New Bag 05/27/23 0825     1,000 mg New Bag 05/26/23 2124     1,000 mg New Bag  0819    vancomycin (VANCOCIN) 1,000 mg in dextrose 5 % (D5W) 250 mL IVPB (Vial-Mate) (mg) 1,000 mg New Bag 05/25/23 1757                    Microbiologic Results:  Microbiology Results (last 7 days)       Procedure Component Value Units Date/Time    Fungus culture [[id number]] Collected: 05/27/23 0958    Order Status: Sent Specimen: Abscess from Arm, Left Updated: 05/27/23 1154    Gram stain [[id number]] Collected: 05/27/23 0958    Order Status: Sent Specimen: Abscess from Arm, Left Updated: 05/27/23 1154    AFB Culture & Smear [[id number]] Collected: 05/27/23 0958    Order Status: Sent Specimen: Abscess from Arm, Left Updated: 05/27/23 1153    Culture, Anaerobe [[id number]] Collected: 05/27/23 0958    Order Status: Sent Specimen: Abscess from Arm, Left Updated: 05/27/23 1153    Aerobic culture [[id number]] Collected: 05/27/23 0958    Order Status: Sent Specimen: Abscess from Arm, Left Updated: 05/27/23 1153    Blood Culture #2 **CANNOT BE ORDERED STAT** [[id number]] Collected: 05/25/23 1608    Order Status: Completed Specimen: Blood from Peripheral, Hand, Right Updated: 05/26/23 1703     Blood Culture, Routine No Growth to date      No Growth to date    Blood Culture #1 **CANNOT BE ORDERED STAT** [[id number]] Collected: 05/25/23 1605    Order Status: Completed Specimen: Blood from Peripheral, Forearm, Right Updated: 05/26/23 1703     Blood Culture, Routine No Growth to date      No Growth to date    Aerobic culture [[id number]]     Order Status: Canceled Specimen: Abscess from Arm, Right

## 2023-05-27 NOTE — PLAN OF CARE
Recommendations    1. Recommend Boost to increase protein/caloric intake.   2. Recommend Rickie to promote wound healing.   3. Continue to monitor weight changes.   4. Collaboration with medical providers    Goals: 1. Meet % EEN/EPN by RD follow up  Nutrition Goal Status: new  Communication of RD Recs:  (POC)    Assessment and Plan    Nutrition Problem  Increased nutrient needs; protein    Related to (etiology):   Wound healing    Signs and Symptoms (as evidenced by):   Arm abscess    Interventions/Recommendations (treatment strategy):  Rickie BID  Collaboration with medical providers    Nutrition Diagnosis Status:   New

## 2023-05-27 NOTE — NURSING
[facility]  Nurses Note -- 4 Eyes      5/27/2023       Skin assessed on: Q Shift      [x] No Pressure Injuries Present    []Prevention Measures Documented    [] Yes LDA  for Pressure Injury Previously documented     [] Yes New Pressure Injury Discovered   [] LDA for New Pressure Injury Added      Attending RN:  Kylah Valadez, YONIS     Second RN:  Charlotte Good RN

## 2023-05-27 NOTE — BRIEF OP NOTE
Operative Note       Surgery Date: 5/27/2023     Surgeon(s) and Role:     * Jassi West MD - Primary    Pre-op Diagnosis:  Left arm abscess    Post-op Diagnosis:  s/p I&D    Procedure(s) (LRB):  INCISION AND DRAINAGE, UPPER EXTREMITY (Left)    Anesthesia: Choice    Findings/Key Components:  Gross puss deep in arm c/w pre op ct    Core Measure Documentation:  Were antibiotics extended? No  Was the patient administered a VTE Prophylaxis? No. Short procedure; low risk  Estimated Blood Loss: minimal           Specimens (From admission, onward)     Aer ananaeribic cxs         Implants: * No implants in log *    Complications: none           Disposition: PACU - hemodynamically stable.           Condition: Stable    Attestation:  I was present for the entire procedure.    MD Brett

## 2023-05-27 NOTE — ANESTHESIA POSTPROCEDURE EVALUATION
Anesthesia Post Evaluation    Patient: Gisela Guerrero Jr.    Procedure(s) Performed: Procedure(s) (LRB):  INCISION AND DRAINAGE, UPPER EXTREMITY (Left)    Final Anesthesia Type: general      Patient location during evaluation: PACU  Patient participation: Yes- Able to Participate  Level of consciousness: awake and alert and oriented  Post-procedure vital signs: reviewed and stable  Pain management: adequate  Airway patency: patent    PONV status at discharge: No PONV  Anesthetic complications: no      Cardiovascular status: hemodynamically stable and blood pressure returned to baseline  Respiratory status: spontaneous ventilation, room air and unassisted  Hydration status: euvolemic  Follow-up not needed.          Vitals Value Taken Time   /87 05/27/23 1147   Temp 36.6 °C (97.9 °F) 05/27/23 1130   Pulse 78 05/27/23 1154   Resp 6 05/27/23 1154   SpO2 97 % 05/27/23 1154   Vitals shown include unvalidated device data.      Event Time   Out of Recovery 05/27/2023 12:24:00         Pain/Ascencion Score: Pain Rating Prior to Med Admin: 6 (5/27/2023 11:25 AM)  Pain Rating Post Med Admin: 4 (5/27/2023 11:40 AM)  Ascencion Score: 10 (5/27/2023 11:40 AM)

## 2023-05-27 NOTE — NURSING
Returned to room via bed from PACU, no distress noted, denies any pain at this time, dressing CDI to left arm, elevated on blankets, IV zosyn restarted,safety maintained.

## 2023-05-27 NOTE — SUBJECTIVE & OBJECTIVE
Interval History: R arm pain present.     Review of Systems   Constitutional:  Negative for chills and fever.   Respiratory:  Negative for shortness of breath.    Cardiovascular:  Negative for chest pain.   Gastrointestinal:  Negative for abdominal pain.   Genitourinary:  Negative for difficulty urinating.   Musculoskeletal:  Positive for myalgias. Negative for arthralgias.   Skin:  Positive for rash and wound.   Psychiatric/Behavioral:  Negative for confusion.    Objective:     Vital Signs (Most Recent):  Temp: 99 °F (37.2 °C) (05/27/23 1015)  Pulse: 94 (05/27/23 1015)  Resp: (!) 21 (05/27/23 1102)  BP: (!) 141/100 (05/27/23 1015)  SpO2: 100 % (FM) (05/27/23 1015) Vital Signs (24h Range):  Temp:  [97.5 °F (36.4 °C)-99.9 °F (37.7 °C)] 99 °F (37.2 °C)  Pulse:  [72-94] 94  Resp:  [16-21] 21  SpO2:  [97 %-100 %] 100 %  BP: (117-147)/() 141/100     Weight: 64.4 kg (142 lb)  Body mass index is 19.26 kg/m².    Intake/Output Summary (Last 24 hours) at 5/27/2023 1118  Last data filed at 5/27/2023 1009  Gross per 24 hour   Intake 1630 ml   Output 1920 ml   Net -290 ml           Physical Exam  Vitals and nursing note reviewed.   Constitutional:       General: He is not in acute distress.     Appearance: He is not ill-appearing or toxic-appearing.   HENT:      Head: Normocephalic and atraumatic.      Nose: Nose normal.      Mouth/Throat:      Mouth: Mucous membranes are moist.   Eyes:      Conjunctiva/sclera: Conjunctivae normal.   Cardiovascular:      Rate and Rhythm: Normal rate and regular rhythm.   Pulmonary:      Effort: Pulmonary effort is normal.      Breath sounds: Normal breath sounds.   Abdominal:      General: Bowel sounds are normal.      Palpations: Abdomen is soft.   Musculoskeletal:      Comments: R arm swelling, warm to touch, tender to palpation   Skin:     General: Skin is warm and dry.   Neurological:      Mental Status: He is alert. Mental status is at baseline.           Significant Labs: All  pertinent labs within the past 24 hours have been reviewed.    Significant Imaging: I have reviewed all pertinent imaging results/findings within the past 24 hours.

## 2023-05-27 NOTE — ASSESSMENT & PLAN NOTE
He reports a history of HIV positive status. On biktarvy, will resume though unclear as to compliance he states daily but then reports sometimes weekly compliance. No prior labs to review  - last CD4 377/42%-- normal immune system  - at this time, no signs of HIV disease

## 2023-05-28 PROCEDURE — 63600175 PHARM REV CODE 636 W HCPCS: Performed by: ORTHOPAEDIC SURGERY

## 2023-05-28 PROCEDURE — 63600175 PHARM REV CODE 636 W HCPCS: Performed by: HOSPITALIST

## 2023-05-28 PROCEDURE — 25000003 PHARM REV CODE 250: Performed by: ORTHOPAEDIC SURGERY

## 2023-05-28 PROCEDURE — 25000003 PHARM REV CODE 250: Performed by: HOSPITALIST

## 2023-05-28 PROCEDURE — 11000001 HC ACUTE MED/SURG PRIVATE ROOM

## 2023-05-28 RX ADMIN — Medication 6 MG: at 08:05

## 2023-05-28 RX ADMIN — PIPERACILLIN AND TAZOBACTAM 4.5 G: 4; .5 INJECTION, POWDER, LYOPHILIZED, FOR SOLUTION INTRAVENOUS; PARENTERAL at 07:05

## 2023-05-28 RX ADMIN — AMITRIPTYLINE HYDROCHLORIDE 50 MG: 25 TABLET, FILM COATED ORAL at 08:05

## 2023-05-28 RX ADMIN — PIPERACILLIN AND TAZOBACTAM 4.5 G: 4; .5 INJECTION, POWDER, LYOPHILIZED, FOR SOLUTION INTRAVENOUS; PARENTERAL at 04:05

## 2023-05-28 RX ADMIN — ENOXAPARIN SODIUM 40 MG: 40 INJECTION SUBCUTANEOUS at 04:05

## 2023-05-28 RX ADMIN — BICTEGRAVIR SODIUM, EMTRICITABINE, AND TENOFOVIR ALAFENAMIDE FUMARATE 1 TABLET: 50; 200; 25 TABLET ORAL at 04:05

## 2023-05-28 RX ADMIN — SERTRALINE 100 MG: 50 TABLET, FILM COATED ORAL at 04:05

## 2023-05-28 RX ADMIN — VANCOMYCIN HYDROCHLORIDE 2000 MG: 1 INJECTION, POWDER, LYOPHILIZED, FOR SOLUTION INTRAVENOUS at 11:05

## 2023-05-28 RX ADMIN — OXYCODONE AND ACETAMINOPHEN 1 TABLET: 10; 325 TABLET ORAL at 08:05

## 2023-05-28 NOTE — NURSING
[facility], [facility]  Nurses Note -- 4 Eyes      5-27-23        Skin assessed on: Q Shift      [x] No Pressure Injuries Present    []Prevention Measures Documented    [] Yes LDA  for Pressure Injury Previously documented     [] Yes New Pressure Injury Discovered   [] LDA for New Pressure Injury Added      Attending RN:  Charlotte Good, RN     Second RN:  Jeanie Valadez RN

## 2023-05-28 NOTE — SUBJECTIVE & OBJECTIVE
Interval History: R arm pain improving. Wants double portions.     Review of Systems   Constitutional:  Negative for chills and fever.   Respiratory:  Negative for shortness of breath.    Cardiovascular:  Negative for chest pain.   Gastrointestinal:  Negative for abdominal pain.   Genitourinary:  Negative for difficulty urinating.   Musculoskeletal:  Positive for myalgias. Negative for arthralgias.   Psychiatric/Behavioral:  Negative for confusion.    Objective:     Vital Signs (Most Recent):  Temp: 98.7 °F (37.1 °C) (05/28/23 0716)  Pulse: 66 (05/28/23 0716)  Resp: 18 (05/28/23 0716)  BP: (!) 140/75 (05/28/23 0716)  SpO2: 99 % (05/28/23 0716) Vital Signs (24h Range):  Temp:  [97.5 °F (36.4 °C)-99 °F (37.2 °C)] 98.7 °F (37.1 °C)  Pulse:  [66-94] 66  Resp:  [16-21] 18  SpO2:  [97 %-100 %] 99 %  BP: (121-163)/() 140/75     Weight: 64.4 kg (142 lb)  Body mass index is 19.26 kg/m².    Intake/Output Summary (Last 24 hours) at 5/28/2023 0947  Last data filed at 5/27/2023 1744  Gross per 24 hour   Intake 910 ml   Output 20 ml   Net 890 ml           Physical Exam  Vitals and nursing note reviewed.   Constitutional:       General: He is not in acute distress.     Appearance: He is not ill-appearing or toxic-appearing.   HENT:      Head: Normocephalic and atraumatic.      Nose: Nose normal.      Mouth/Throat:      Mouth: Mucous membranes are moist.   Eyes:      Conjunctiva/sclera: Conjunctivae normal.   Cardiovascular:      Rate and Rhythm: Normal rate and regular rhythm.   Pulmonary:      Effort: Pulmonary effort is normal.      Breath sounds: Normal breath sounds.   Abdominal:      General: Bowel sounds are normal.      Palpations: Abdomen is soft.   Musculoskeletal:      Comments: R arm bandaged   Skin:     General: Skin is warm and dry.   Neurological:      Mental Status: He is alert. Mental status is at baseline.           Significant Labs: All pertinent labs within the past 24 hours have been  reviewed.    Significant Imaging: I have reviewed all pertinent imaging results/findings within the past 24 hours.

## 2023-05-28 NOTE — PLAN OF CARE
Patient has been calm, cooperative and participating with care as ordered for my shift. Surgical dressing on left arm CDI; elevated on pillows. IV antibiotics as ordered. Medicated once for left arm pain with po narcotic as ordered. Continue with plan of care as ordered. No distress noted  Problem: Adult Inpatient Plan of Care  Goal: Plan of Care Review  Outcome: Ongoing, Progressing  Goal: Patient-Specific Goal (Individualized)  Outcome: Ongoing, Progressing  Goal: Optimal Comfort and Wellbeing  Outcome: Ongoing, Progressing  Goal: Readiness for Transition of Care  Outcome: Ongoing, Progressing     Problem: Fall Injury Risk  Goal: Absence of Fall and Fall-Related Injury  Outcome: Ongoing, Progressing

## 2023-05-28 NOTE — PROGRESS NOTES
[facility]  Progress Note    Patient Name: Gisela Guerrero Jr.  MRN: [id number]  Patient Class: IP- Inpatient   Admission Date: 5/25/2023  Length of Stay: 3 days  Attending Physician: Lisa Houser MD  Primary Care Provider: Primary Doctor No        Subjective:     Principal Problem:Arm abscess        HPI:  Gisela Guerrero Jr. 42 y.o. male with HIV, PTSD, THC use presents to the hospital with a chief complaint of arm pain and foot pain.  He reports 2 weeks of constant progressive right toe pain.  He attributes this pain due to frequent ambulation and denies any trauma to the foot.  He presented to the hospital today due to left arm swelling which he describes as a heavy pressure that is improved with incision and drainage in the emergency room.  He denies any IV drug use and reports swelling began initially as a bug bite.  He reports he is intermittently compliant with home Biktarvy sometimes taking daily and sometimes weekly.  He denies any recent antibiotic use.  He denies fever chest pain shortness of breath nausea vomiting abdominal pain hematuria hematemesis.    Afebrile without leukocytosis x-ray foot with Minimally displaced intra-articular fracture involving the medial base of the proximal phalanx of the right great toe extra humerus with 4 mm lucency projecting over the soft tissues of the left upper arm at the level of the mid humerus.  And a Linear metallic density projecting over the proximal left forearm measuring approximately 7 mm in length.  CRP elevated lactic acid elevated.      Overview/Hospital Course:  Mr Gisela Guerrero Jr. Was admitted with left arm abscess. Started antibiotics. Ortho consulted. CT with 4.4x3.6x8cm abscess in biceps muscle. Suspect this is associated with IVDU rather than bug bite. S/p I&D by Ortho on 5/27/23. Gram stain with many GPC, culture pending.       Interval History: R arm pain improving. Wants double portions.     Review of Systems   Constitutional:   Negative for chills and fever.   Respiratory:  Negative for shortness of breath.    Cardiovascular:  Negative for chest pain.   Gastrointestinal:  Negative for abdominal pain.   Genitourinary:  Negative for difficulty urinating.   Musculoskeletal:  Positive for myalgias. Negative for arthralgias.   Psychiatric/Behavioral:  Negative for confusion.    Objective:     Vital Signs (Most Recent):  Temp: 98.7 °F (37.1 °C) (05/28/23 0716)  Pulse: 66 (05/28/23 0716)  Resp: 18 (05/28/23 0716)  BP: (!) 140/75 (05/28/23 0716)  SpO2: 99 % (05/28/23 0716) Vital Signs (24h Range):  Temp:  [97.5 °F (36.4 °C)-99 °F (37.2 °C)] 98.7 °F (37.1 °C)  Pulse:  [66-94] 66  Resp:  [16-21] 18  SpO2:  [97 %-100 %] 99 %  BP: (121-163)/() 140/75     Weight: 64.4 kg (142 lb)  Body mass index is 19.26 kg/m².    Intake/Output Summary (Last 24 hours) at 5/28/2023 0947  Last data filed at 5/27/2023 1744  Gross per 24 hour   Intake 910 ml   Output 20 ml   Net 890 ml           Physical Exam  Vitals and nursing note reviewed.   Constitutional:       General: He is not in acute distress.     Appearance: He is not ill-appearing or toxic-appearing.   HENT:      Head: Normocephalic and atraumatic.      Nose: Nose normal.      Mouth/Throat:      Mouth: Mucous membranes are moist.   Eyes:      Conjunctiva/sclera: Conjunctivae normal.   Cardiovascular:      Rate and Rhythm: Normal rate and regular rhythm.   Pulmonary:      Effort: Pulmonary effort is normal.      Breath sounds: Normal breath sounds.   Abdominal:      General: Bowel sounds are normal.      Palpations: Abdomen is soft.   Musculoskeletal:      Comments: R arm bandaged   Skin:     General: Skin is warm and dry.   Neurological:      Mental Status: He is alert. Mental status is at baseline.           Significant Labs: All pertinent labs within the past 24 hours have been reviewed.    Significant Imaging: I have reviewed all pertinent imaging results/findings within the past 24  hours.      Assessment/Plan:      * Arm abscess  Presents with abscess of left arm. X-ray arm without evidence of fracture though 4mm lucency possibly skin puncture, and linear metalic density over left forearm. Abscess drained in ED but cultures not collected from abscess. CT confirms abscess in biceps muscle.   - Started on zosyn/vanc  - Blood cultures NGTD  - Ortho consulted- s/p I&D on 5/27/23. Drain in place to be removed on Monday. Cultures sent- will follow up. Gram stain GPC  - PRN pain Rx    PTSD (post-traumatic stress disorder)  Continue home zoloft and elevail    Tetrahydrocannabinol (THC) use disorder, mild, abuse  Counseled on cessation.     Displaced fracture of distal phalanx of right great toe, initial encounter for closed fracture  Complained of toe pain in the ED, x-ray with Minimally displaced intra-articular fracture involving the medial base of the proximal phalanx of the right great toe  - Place in post op shoe      Asymptomatic HIV infection  He reports a history of HIV positive status. On biktarvy, will resume though unclear as to compliance he states daily but then reports sometimes weekly compliance. No prior labs to review  - last CD4 377/42%-- normal immune system  - at this time, no signs of HIV disease        VTE Risk Mitigation (From admission, onward)         Ordered     enoxaparin injection 40 mg  Every 24 hours         05/27/23 1121     IP VTE LOW RISK PATIENT  Once         05/25/23 1856     Place sequential compression device  Until discontinued         05/25/23 1856                Discharge Planning   PETER: 5/30/2023     Code Status: Full Code   Is the patient medically ready for discharge?:     Reason for patient still in hospital (select all that apply): Patient trending condition  Discharge Plan A: Home                  Lisa Houser MD  Department of Hospital Medicine   [facility] [place] [facility]

## 2023-05-28 NOTE — PROGRESS NOTES
POD#1  No new issues. Pain is somewhat better  VSS    Left arm- drain removed. No pus. Mild s/s drainage. NVI distally  Cx- G+ cocci    A/P Left arm abscess  Penrose removed  Dsg changed  Ok for discharge from Ortho standpoint  F/U with Dr West in 7-10 days

## 2023-05-28 NOTE — ASSESSMENT & PLAN NOTE
Presents with abscess of left arm. X-ray arm without evidence of fracture though 4mm lucency possibly skin puncture, and linear metalic density over left forearm. Abscess drained in ED but cultures not collected from abscess. CT confirms abscess in biceps muscle.   - Started on zosyn/vanc  - Blood cultures NGTD  - Ortho consulted- s/p I&D on 5/27/23. Drain in place to be removed on Monday. Cultures sent- will follow up. Gram stain GPC  - PRN pain Rx

## 2023-05-29 VITALS
SYSTOLIC BLOOD PRESSURE: 115 MMHG | TEMPERATURE: 98 F | RESPIRATION RATE: 18 BRPM | BODY MASS INDEX: 19.23 KG/M2 | DIASTOLIC BLOOD PRESSURE: 66 MMHG | HEIGHT: 72 IN | WEIGHT: 142 LBS | HEART RATE: 59 BPM | OXYGEN SATURATION: 100 %

## 2023-05-29 LAB
ANION GAP SERPL CALC-SCNC: 5 MMOL/L (ref 8–16)
BACTERIA BLD CULT: NORMAL
BACTERIA BLD CULT: NORMAL
BUN SERPL-MCNC: 14 MG/DL (ref 6–20)
CALCIUM SERPL-MCNC: 8.4 MG/DL (ref 8.7–10.5)
CHLORIDE SERPL-SCNC: 103 MMOL/L (ref 95–110)
CO2 SERPL-SCNC: 29 MMOL/L (ref 23–29)
CREAT SERPL-MCNC: 0.9 MG/DL (ref 0.5–1.4)
EST. GFR  (NO RACE VARIABLE): >60 ML/MIN/1.73 M^2
GLUCOSE SERPL-MCNC: 83 MG/DL (ref 70–110)
POTASSIUM SERPL-SCNC: 3.8 MMOL/L (ref 3.5–5.1)
SODIUM SERPL-SCNC: 137 MMOL/L (ref 136–145)
VANCOMYCIN TROUGH SERPL-MCNC: 23.7 UG/ML (ref 10–22)

## 2023-05-29 PROCEDURE — 25000003 PHARM REV CODE 250: Performed by: HOSPITALIST

## 2023-05-29 PROCEDURE — 80048 BASIC METABOLIC PNL TOTAL CA: CPT | Performed by: HOSPITALIST

## 2023-05-29 PROCEDURE — 63600175 PHARM REV CODE 636 W HCPCS: Performed by: ORTHOPAEDIC SURGERY

## 2023-05-29 PROCEDURE — 25000003 PHARM REV CODE 250: Performed by: ORTHOPAEDIC SURGERY

## 2023-05-29 PROCEDURE — 36415 COLL VENOUS BLD VENIPUNCTURE: CPT | Performed by: HOSPITALIST

## 2023-05-29 PROCEDURE — 80202 ASSAY OF VANCOMYCIN: CPT | Performed by: HOSPITALIST

## 2023-05-29 PROCEDURE — 63600175 PHARM REV CODE 636 W HCPCS: Performed by: HOSPITALIST

## 2023-05-29 RX ORDER — SULFAMETHOXAZOLE AND TRIMETHOPRIM 800; 160 MG/1; MG/1
1 TABLET ORAL 2 TIMES DAILY
Qty: 20 TABLET | Refills: 0 | OUTPATIENT
Start: 2023-05-29 | End: 2023-06-05

## 2023-05-29 RX ADMIN — PIPERACILLIN AND TAZOBACTAM 4.5 G: 4; .5 INJECTION, POWDER, LYOPHILIZED, FOR SOLUTION INTRAVENOUS; PARENTERAL at 04:05

## 2023-05-29 RX ADMIN — VANCOMYCIN HYDROCHLORIDE 2000 MG: 1 INJECTION, POWDER, LYOPHILIZED, FOR SOLUTION INTRAVENOUS at 01:05

## 2023-05-29 RX ADMIN — AMITRIPTYLINE HYDROCHLORIDE 50 MG: 25 TABLET, FILM COATED ORAL at 08:05

## 2023-05-29 NOTE — DISCHARGE SUMMARY
[facility] Medicine  Discharge Summary      Patient Name: Gisela Guerrero Jr.  MRN: [id number]  ALESSANDRO: [id number]  Patient Class: IP- Inpatient  Admission Date: 5/25/2023  Hospital Length of Stay: 4 days  Discharge Date and Time:  05/29/2023 8:41 AM  Attending Physician: Lisa Houser MD   Discharging Provider: Lisa Houser MD  Primary Care Provider: Primary Doctor No    Primary Care Team: Networked reference to record PCT     HPI:   Gisela Guerrero Jr. 42 y.o. male with HIV, PTSD, THC use presents to the hospital with a chief complaint of arm pain and foot pain.  He reports 2 weeks of constant progressive right toe pain.  He attributes this pain due to frequent ambulation and denies any trauma to the foot.  He presented to the hospital today due to left arm swelling which he describes as a heavy pressure that is improved with incision and drainage in the emergency room.  He denies any IV drug use and reports swelling began initially as a bug bite.  He reports he is intermittently compliant with home Biktarvy sometimes taking daily and sometimes weekly.  He denies any recent antibiotic use.  He denies fever chest pain shortness of breath nausea vomiting abdominal pain hematuria hematemesis.    Afebrile without leukocytosis x-ray foot with Minimally displaced intra-articular fracture involving the medial base of the proximal phalanx of the right great toe extra humerus with 4 mm lucency projecting over the soft tissues of the left upper arm at the level of the mid humerus.  And a Linear metallic density projecting over the proximal left forearm measuring approximately 7 mm in length.  CRP elevated lactic acid elevated.      Procedure(s) (LRB):  INCISION AND DRAINAGE, UPPER EXTREMITY (Left)      Hospital Course:   Mr Gisela Guerrero Jr. Was admitted with left arm abscess. Started antibiotics. Ortho consulted. CT with 4.4x3.6x8cm abscess in biceps muscle. Suspect this is associated with IVDU rather than  bug bite. S/p I&D by Ortho on 5/27/23. Gram stain with many GPC, culture no growth. Drain pulled on 5/28. Stable for discharge to home. Follow up with Ortho. Bactrim prescribed to complete 10 days.       Goals of Care Treatment Preferences:  Code Status: Full Code      Consults:   Consults (From admission, onward)        Status Ordering Provider     Case Management/  Once        Provider:  (Not yet assigned)    Completed OMEGA HOLBROOK     Pharmacy to dose Vancomycin consult  Once        Provider:  (Not yet assigned)   See Hyperspace for full Linked Orders Report.    Acknowledged KELVIN BROWN     Inpatient consult to Orthopedic Surgery  Once        Provider:  Kelvin Brown MD    Completed OMEGA HOLBROOK          No new Assessment & Plan notes have been filed under this hospital service since the last note was generated.  Service: Hospital Medicine    Final Active Diagnoses:    Diagnosis Date Noted POA    PRINCIPAL PROBLEM:  Arm abscess [L02.419] 05/25/2023 Yes    Asymptomatic HIV infection [Z21] 05/25/2023 Yes    Displaced fracture of distal phalanx of right great toe, initial encounter for closed fracture [S92.421A] 05/25/2023 Yes    Tetrahydrocannabinol (THC) use disorder, mild, abuse [F12.10] 05/25/2023 Yes    PTSD (post-traumatic stress disorder) [F43.10] 05/25/2023 Yes      Problems Resolved During this Admission:       Discharged Condition: good    Disposition: Home or Self Care    Follow Up:   Follow-up Information     Kelvin Brown MD Follow up.    Specialty: Orthopedic Surgery  Contact information:  [address] [facility]  [place] [address]  [phone]                       Patient Instructions:      ORTHOPEDIC BRACING FOR HOME USE - LOWER EXTREMITY     Order Specific Question Answer Comments   Height: 6' (1.829 m)    Weight: 72.6 kg (160 lb)    Length of need (1-99 months): 99    Laterality: Right    Product(s) ordered: Other    Check any that apply: Patient  requires assistive device for ambulation post op shoe     Diet Adult Regular     Notify your health care provider if you experience any of the following:  temperature >100.4     Notify your health care provider if you experience any of the following:  persistent nausea and vomiting or diarrhea     Notify your health care provider if you experience any of the following:  severe uncontrolled pain     Notify your health care provider if you experience any of the following:  redness, tenderness, or signs of infection (pain, swelling, redness, odor or green/yellow discharge around incision site)     Notify your health care provider if you experience any of the following:  difficulty breathing or increased cough     Notify your health care provider if you experience any of the following:  severe persistent headache     Notify your health care provider if you experience any of the following:  worsening rash     Notify your health care provider if you experience any of the following:  persistent dizziness, light-headedness, or visual disturbances     Notify your health care provider if you experience any of the following:  increased confusion or weakness     Activity as tolerated       Significant Diagnostic Studies: N/A    Pending Diagnostic Studies:     None         Medications:  Reconciled Home Medications:      Medication List      START taking these medications    sulfamethoxazole-trimethoprim 800-160mg 800-160 mg Tab  Commonly known as: BACTRIM DS  Take 1 tablet by mouth 2 (two) times daily. for 10 days        CONTINUE taking these medications    amitriptyline 50 MG tablet  Commonly known as: ELAVIL  Take 1 tablet by mouth 2 (two) times a day.     BIKTARVY -25 mg (25 kg or greater)  Generic drug: nlhootqus-yzcehuvw-lhcemyd ala  Take 1 tablet by mouth Daily.     sertraline 100 MG tablet  Commonly known as: ZOLOFT  Take 100 mg by mouth Daily.            Indwelling Lines/Drains at time of discharge:    Lines/Drains/Airways     None                 Time spent on the discharge of patient: 35 minutes         Lisa Houser MD  Department of Hospital Medicine  [facility] Surg

## 2023-05-29 NOTE — PLAN OF CARE
[facility] Surg  Discharge Final Note    Patient clear to discharge from case management stand point. Follow up ortho appointment scheduled and listed on avs. Pt to follow up with [facility] listed on avs.     Primary Care Provider: Primary Doctor No    Expected Discharge Date: 5/29/2023    Final Discharge Note (most recent)       Final Note - 05/29/23 1112          Final Note    Assessment Type Final Discharge Note     Anticipated Discharge Disposition Home or Self Care     What phone number can be called within the next 1-3 days to see how you are doing after discharge? [phone] (P)      Hospital Resources/Appts/Education Provided Provided patient/caregiver with written discharge plan information (P)         Post-Acute Status    Coverage JAROCHO (P)      Discharge Delays None known at this time (P)                      Important Message from Medicare             Contact Info       Chase Hernandez MD   Specialty: Orthopedic Surgery    [address] [place] [address]  [address]  [place] [address]   Phone: [phone]       Next Steps: Follow up on 6/16/2023    Instructions: Appointment scheuled for 1:30pm  Please bring in Insurance cards    [facility]    [address] [facility]  [place] [address]   Phone: [phone]       Next Steps: Schedule an appointment as soon as possible for a visit in 1 week(s)

## 2023-05-31 ENCOUNTER — PATIENT OUTREACH (OUTPATIENT)
Dept: ADMINISTRATIVE | Facility: CLINIC | Age: 43
End: 2023-05-31
Payer: MEDICAID

## 2023-05-31 LAB
BACTERIA SPEC AEROBE CULT: NORMAL
BACTERIA SPEC ANAEROBE CULT: NORMAL

## 2023-05-31 NOTE — PROGRESS NOTES
C3 nurse attempted to contact Gisela Guerrero Jr. for a TCC post hospital discharge follow up call. No answer; No Voicemail. The patient does not have a scheduled HOSFU appointment, and the pt does not have an [facility] PCP.

## 2023-06-01 NOTE — OP NOTE
Date of surgery:  May 27, 2023  Surgeon:  Jassi West MD  Assistant:  LOURDES Barnes    Pre-procedure diagnosis:  Left arm abscess  Postprocedure diagnosis:  Same status post I&D  Procedure performed incision and drainage of left upper extremity  Anesthesia:  General  Findings:  Deep gross purulence  Estimated blood loss minimal  Implants:  1 Penrose drain  Specimens:  Aerobic anaerobic AFB and fungal cultures      Brief indication for procedure:  The patient is a 42-year-old male with history of left upper extremity abscess see epic notes for details risks benefits and alternatives were anticipated convalescence of surgical I&D was explained in detail to the patient patient agreed and wished to proceed with surgery informed consent was signed.    Procedure detail:  The day of surgery patient brought to the operating room prepped and draped in typical sterile fashion for left upper extremity surgery after general anesthesia had been administered.  Subsequently a time-out was called indicate the correct patient laterality surgery to be performed as administration of IV antibiotics everybody agreed and would like to proceed with surgery.  Subsequently incision was made over the anterior aspect of the proximal arm directly over the area of fluctuance.  Dissection was carried down carefully and bluntly with care taken to not injure surrounding neurovascular structures.  Blunt dissection was carried down to the level of the biceps muscle itself blunt digital dissection was used and immediately large amounts of gross purulence was encountered.  This was sent for the above-mentioned cultures.  Any unhealthy tissue was debrided sharply.  The wound was irrigated copiously with normal saline under pulsatile lavage the wound was approximated with nylon sutures loosely after Penrose drain was placed.  Care was taken to protect not injure neurovascular structures with suturing and care was taken to not sew in the drain.  All  surgical sponge counts were correct at the end of the case the patient was transferred to PACU in stable condition.    Postop check:  Afebrile stable vital signs pain controlled palpable pulse neurovascular intact operative extremity    Jassi West MD  [facility]

## 2023-06-05 ENCOUNTER — HOSPITAL ENCOUNTER (EMERGENCY)
Facility: HOSPITAL | Age: 43
Discharge: HOME | End: 2023-06-05
Attending: INTERNAL MEDICINE
Payer: MEDICAID

## 2023-06-05 VITALS
BODY MASS INDEX: 19.23 KG/M2 | DIASTOLIC BLOOD PRESSURE: 65 MMHG | OXYGEN SATURATION: 100 % | WEIGHT: 142 LBS | HEIGHT: 72 IN | TEMPERATURE: 98 F | SYSTOLIC BLOOD PRESSURE: 106 MMHG | HEART RATE: 65 BPM | RESPIRATION RATE: 18 BRPM

## 2023-06-05 DIAGNOSIS — Z48.89 ENCOUNTER FOR POST SURGICAL WOUND CHECK: Primary | ICD-10-CM

## 2023-06-05 PROCEDURE — 99283 EMERGENCY DEPT VISIT LOW MDM: CPT

## 2023-06-05 PROCEDURE — 25000003 PHARM REV CODE 250: Performed by: INTERNAL MEDICINE

## 2023-06-05 RX ORDER — SULFAMETHOXAZOLE AND TRIMETHOPRIM 800; 160 MG/1; MG/1
1 TABLET ORAL
Status: COMPLETED | OUTPATIENT
Start: 2023-06-05 | End: 2023-06-05

## 2023-06-05 RX ORDER — SULFAMETHOXAZOLE AND TRIMETHOPRIM 800; 160 MG/1; MG/1
1 TABLET ORAL 2 TIMES DAILY
Qty: 20 TABLET | Refills: 0 | Status: SHIPPED | OUTPATIENT
Start: 2023-06-05 | End: 2023-06-15

## 2023-06-05 RX ADMIN — SULFAMETHOXAZOLE AND TRIMETHOPRIM 1 TABLET: 800; 160 TABLET ORAL at 05:06

## 2023-06-05 NOTE — ED NOTES
Pt comes to the er via ems with c/o left upper arm pain. Pt was seen 5 days ago and had a cyst cut out. Pt reported that he has not gotten his medicine filled because he does not have a phone to do so or the means. Pt also stated that he is not compliant on his HIV medications due to the same reason. Pt has sores all over his body.

## 2023-06-06 NOTE — ED PROVIDER NOTES
Encounter Date: 6/5/2023       History     Chief Complaint   Patient presents with    Wound Check     Patient arrives via EMS with pain to upper left arm. Was seen for abscess to this area and received a stitch and an Rx for antibiotics. Reports that he is having difficulty keeping area clean and that he has not taken his antibiotics.      42-year-old male presents to the emergency department requesting prescription for antibiotics for left forearm wound.  Patient was seen at this emergency department course in May and had a left forearm abscess I indeed.  Bactrim prescription was given, but patient states he never to antibiotics after discharge.  He denies fever/chills.    The history is provided by the patient. No  was used.   Review of patient's allergies indicates:   Allergen Reactions    Ibuprofen Rash    Vicodin [hydrocodone-acetaminophen] Rash     Past Medical History:   Diagnosis Date    Human immunodeficiency virus (HIV) disease      Past Surgical History:   Procedure Laterality Date    INCISION AND DRAINAGE, UPPER EXTREMITY Left 5/27/2023    Procedure: INCISION AND DRAINAGE, UPPER EXTREMITY;  Surgeon: Jassi West MD;  Location: [facility] OR;  Service: Orthopedics;  Laterality: Left;     History reviewed. No pertinent family history.     Review of Systems   Constitutional:  Negative for chills and fever.   Respiratory:  Negative for shortness of breath.    Cardiovascular:  Negative for chest pain.   All other systems reviewed and are negative.    Physical Exam     Initial Vitals [06/05/23 0427]   BP Pulse Resp Temp SpO2   110/62 90 18 97.7 °F (36.5 °C) 99 %      MAP       --         Physical Exam    Nursing note and vitals reviewed.  Constitutional: He is not diaphoretic. No distress.   HENT:   Head: Normocephalic and atraumatic.   Right Ear: External ear normal.   Left Ear: External ear normal.   Cardiovascular:  Normal rate and regular rhythm.           Pulmonary/Chest: Breath sounds  normal. No respiratory distress.     Neurological: He is alert. He has normal strength.   Skin: Skin is warm and dry. Capillary refill takes less than 2 seconds.   Healing left forearm wound without drainage or signs of acute infection.       ED Course   Procedures  Labs Reviewed - No data to display       Imaging Results    None          Medications   sulfamethoxazole-trimethoprim 800-160mg per tablet 1 tablet (1 tablet Oral Given 6/5/23 0527)     Medical Decision Making:   Initial Assessment:   42-year-old male presents to the emergency department requesting prescription for antibiotics for left forearm wound.  Patient was seen at this emergency department course in May and had a left forearm abscess I indeed.  Bactrim prescription was given, but patient states he never to antibiotics after discharge.  He denies fever/chills.  ED Management:  Exam today reveals no acute infection.  Bactrim prescription was rewritten and patient was given instructions for wound care.  He was advised to follow-up with his primary care physician within the next week for re-evaluation/return to the emergency department if condition worsens.                        Clinical Impression:   Final diagnoses:  [Z48.89] Encounter for post surgical wound check (Primary)        ED Disposition Condition    Discharge Stable          ED Prescriptions       Medication Sig Dispense Start Date End Date Auth. Provider    sulfamethoxazole-trimethoprim 800-160mg (BACTRIM DS) 800-160 mg Tab Take 1 tablet by mouth 2 (two) times daily. for 10 days 20 tablet 6/5/2023 6/15/2023 Sameer Georges MD          Follow-up Information    None          Sameer Georges MD  06/05/23 4427

## 2023-06-14 ENCOUNTER — HOSPITAL ENCOUNTER (EMERGENCY)
Facility: OTHER | Age: 43
Discharge: HOME | End: 2023-06-14
Attending: EMERGENCY MEDICINE
Payer: MEDICAID

## 2023-06-14 VITALS
RESPIRATION RATE: 14 BRPM | SYSTOLIC BLOOD PRESSURE: 131 MMHG | OXYGEN SATURATION: 98 % | HEART RATE: 67 BPM | DIASTOLIC BLOOD PRESSURE: 75 MMHG | TEMPERATURE: 97 F

## 2023-06-14 DIAGNOSIS — S80.11XA CONTUSION OF RIGHT LOWER EXTREMITY, INITIAL ENCOUNTER: ICD-10-CM

## 2023-06-14 DIAGNOSIS — T14.90XA TRAUMA: ICD-10-CM

## 2023-06-14 DIAGNOSIS — S90.31XA CONTUSION OF RIGHT FOOT, INITIAL ENCOUNTER: Primary | ICD-10-CM

## 2023-06-14 DIAGNOSIS — S90.01XA CONTUSION OF RIGHT ANKLE, INITIAL ENCOUNTER: ICD-10-CM

## 2023-06-14 PROCEDURE — 99283 EMERGENCY DEPT VISIT LOW MDM: CPT

## 2023-06-14 RX ORDER — ACETAMINOPHEN 500 MG
1000 TABLET ORAL
Status: DISCONTINUED | OUTPATIENT
Start: 2023-06-14 | End: 2023-06-14 | Stop reason: HOSPADM

## 2023-06-14 NOTE — ED TRIAGE NOTES
Pt c/o right leg pain and right heel pain after injuring both in a fence. Denies any other injuries.

## 2023-06-14 NOTE — ED PROVIDER NOTES
Encounter Date: 6/14/2023    SCRIBE #1 NOTE: I, Renuka Franco, am scribing for, and in the presence of,  Kayode Wheeler MD. I have scribed the following portions of the note - Other sections scribed: HPI, ROS, PE.     History     Chief Complaint   Patient presents with    Leg Pain     R leg pain after being stuck between a fence and a building.      Time seen by provider: 2:19 PM    This is a 42 y.o. male who presents via EMS with complaint of non-specific right leg pain. EMS found the patient with his right leg stuck between a fence and a house. He states he was stuck there throughout last night and has not been able to walk. This is the extent of the patient's complaints at this time.    The history is provided by the patient.   Review of patient's allergies indicates:   Allergen Reactions    Ibuprofen Rash    Vicodin [hydrocodone-acetaminophen] Rash     Past Medical History:   Diagnosis Date    Human immunodeficiency virus (HIV) disease      Past Surgical History:   Procedure Laterality Date    INCISION AND DRAINAGE, UPPER EXTREMITY Left 5/27/2023    Procedure: INCISION AND DRAINAGE, UPPER EXTREMITY;  Surgeon: Jassi West MD;  Location: [facility];  Service: Orthopedics;  Laterality: Left;     No family history on file.     Review of Systems   Constitutional:  Negative for chills and fever.   HENT:  Negative for congestion and sore throat.    Eyes:  Negative for photophobia and redness.   Respiratory:  Negative for cough and shortness of breath.    Cardiovascular:  Negative for chest pain.   Gastrointestinal:  Negative for abdominal pain, nausea and vomiting.   Genitourinary:  Negative for dysuria.   Musculoskeletal:  Positive for myalgias. Negative for back pain.   Skin:  Negative for rash.   Neurological:  Negative for weakness, light-headedness and headaches.   Psychiatric/Behavioral:  Negative for confusion.      Physical Exam     Initial Vitals [06/14/23 1145]   BP Pulse Resp Temp SpO2   131/75 67  14 97 °F (36.1 °C) 98 %      MAP       --         Physical Exam    Nursing note and vitals reviewed.  Constitutional: He appears well-developed and well-nourished. He is not diaphoretic. No distress.   Agitated and uncooperative. Yelling at staff.   HENT:   Head: Normocephalic and atraumatic.   Mouth/Throat: Oropharynx is clear and moist.   Eyes: Conjunctivae and EOM are normal. Pupils are equal, round, and reactive to light.   Conjunctivae pink, clear, and intact.    Neck: Neck supple.   No cervical lymphadenopathy.    Normal range of motion.  Cardiovascular:  Normal rate, regular rhythm and normal heart sounds.     Exam reveals no gallop and no friction rub.       No murmur heard.  Pulses:       Dorsalis pedis pulses are 2+ on the right side.        Posterior tibial pulses are 2+ on the right side.   Pulmonary/Chest: Breath sounds normal. No respiratory distress. He has no wheezes.   Musculoskeletal:         General: No tenderness or edema.      Cervical back: Normal range of motion and neck supple.      Comments: Right lower extremity without deformity, crepitus, or step-offs. No clinical evidence of abscess, cellulitis, or other infection. No evidence of compartment syndrome or septic joint. Moves leg without complication.     Lymphadenopathy:     He has no cervical adenopathy.   Neurological: He is alert and oriented to person, place, and time.   Skin: Skin is warm and dry. Capillary refill takes less than 2 seconds. No rash noted. No pallor.   Psychiatric: He has a normal mood and affect. Thought content normal.       ED Course   Procedures  Labs Reviewed - No data to display       Imaging Results              X-Ray Foot Complete Right (Final result)  Result time 06/14/23 15:26:18      Final result by Robert Walker MD (06/14/23 15:26:18)                   Impression:      Limited single view study with no acute radiographic abnormality.      Electronically signed by: Robert  Danyel  Date:    06/14/2023  Time:    15:26               Narrative:    EXAMINATION:  XR FOOT COMPLETE 1 VIEW RIGHT    CLINICAL HISTORY:  . Injury, unspecified, initial encounter    TECHNIQUE:  Single oblique lateral view of the right foot.    COMPARISON:  None    FINDINGS:  Alignment is satisfactory.  No acute fracture, subluxation or dislocation.  No mass or foreign body no significant arthropathy.    Limited single view study.                                       X-Ray Tibia Fibula 2 View Right (Final result)  Result time 06/14/23 15:26:11      Final result by Robert Walker MD (06/14/23 15:26:11)                   Impression:      No acute radiographic abnormality.      Electronically signed by: Robert Walker  Date:    06/14/2023  Time:    15:26               Narrative:    EXAMINATION:  XR TIBIA FIBULA 2 VIEW RIGHT    CLINICAL HISTORY:  Injury, unspecified, initial encounter    TECHNIQUE:  AP and lateral views of the right tibia and fibula were performed.    COMPARISON:  None.    FINDINGS:  Four images piecemeal fashion.    No acute fracture, subluxation or dislocation.  No mass or foreign body.  No significant arthropathy.  No osseous destruction.                                    X-Rays:   Independently Interpreted Readings:   Other Readings:  On x-rays of the right foot and tib-fib: no fracture, no dislocation, no acute osseous changes.  Medications - No data to display  Medical Decision Making:   History:   Old Medical Records: I decided to obtain old medical records.  Initial Assessment:   42 year old male with leg pain. Patient was beligerant during examination, screaming, yelling, and not cooperating. Apparent tenderness over right foot, ankle, and tib-fib just with touching the skin. Will order x-ray for further evaluation.  Independently Interpreted Test(s):   I have ordered and independently interpreted X-rays - see prior notes.  Clinical Tests:   Radiological Study: Ordered and Reviewed         Scribe Attestation:   Scribe #1: I performed the above scribed service and the documentation accurately describes the services I performed. I attest to the accuracy of the note.    Attending Attestation:             Attending ED Notes:   Emergent evaluation a 42-year-old male with complaint of right leg pain after he states he got it stuck in a fence.  Patient is noncooperative with examination.  Patient is belligerent, yelling at myself, staff and security.  Patient refuses to answer questions.  Patient is afebrile, nontoxic-appearing stable vital signs.  Patient is neurovascularly intact without any focal neurologic deficits.  On examination of the right lower extremity there is no deformity, crepitus or step-offs.  Patient is neurovascularly intact without focal neurologic deficits.  No clinical evidence of compartment syndrome.  Patient has tenderness over the right tibia/fibula, foot and ankle with light touch.  No tenderness over the femur.  The patient is extensively counseled on their diagnosis and treatment including all diagnostic, laboratory and physical exam findings.  The patient is discharged good condition and directed follow-up with their PCP in the next 24-48 hours.    Patient was witnessed by security jumping out of his wheelchair and walking down the street after discharge from the emergency department.Physician Attestation for Scribe: I, Kayode Wheeler, reviewed documentation as scribed in my presence, which is both accurate and complete.        ED Course as of 06/14/23 2111 Wed Jun 14, 2023   1628 Patient got up and walked out with .  [GS]      ED Course User Index  [GS] Gwyndolin Franco                 Clinical Impression:   Final diagnoses:  [T14.90XA] Trauma  [S90.31XA] Contusion of right foot, initial encounter (Primary)  [S90.01XA] Contusion of right ankle, initial encounter  [S80.11XA] Contusion of right lower extremity, initial encounter        ED Disposition Condition     Discharge Good          ED Prescriptions    None       Follow-up Information       Follow up With Specialties Details Why Contact Info    [facility]  In 2 days  [address] [facility] [facility] [address]             Kayode Rodriguez MD  06/14/23 9576

## 2023-06-26 LAB — FUNGUS SPEC CULT: NORMAL

## 2023-07-16 LAB
ACID FAST MOD KINY STN SPEC: NORMAL
MYCOBACTERIUM SPEC QL CULT: NORMAL

## 2023-07-30 ENCOUNTER — HOSPITAL ENCOUNTER (EMERGENCY)
Facility: HOSPITAL | Age: 43
Discharge: HOME | End: 2023-07-30
Attending: EMERGENCY MEDICINE
Payer: MEDICAID

## 2023-07-30 VITALS
BODY MASS INDEX: 19.64 KG/M2 | OXYGEN SATURATION: 99 % | WEIGHT: 145 LBS | TEMPERATURE: 98 F | HEIGHT: 72 IN | SYSTOLIC BLOOD PRESSURE: 123 MMHG | HEART RATE: 99 BPM | RESPIRATION RATE: 18 BRPM | DIASTOLIC BLOOD PRESSURE: 86 MMHG

## 2023-07-30 DIAGNOSIS — R31.9 HEMATURIA, UNSPECIFIED TYPE: ICD-10-CM

## 2023-07-30 DIAGNOSIS — M79.672 LEFT FOOT PAIN: ICD-10-CM

## 2023-07-30 DIAGNOSIS — F43.10 PTSD (POST-TRAUMATIC STRESS DISORDER): ICD-10-CM

## 2023-07-30 DIAGNOSIS — Z21 ASYMPTOMATIC HIV INFECTION: ICD-10-CM

## 2023-07-30 DIAGNOSIS — Z59.00 HOMELESSNESS: Primary | ICD-10-CM

## 2023-07-30 LAB
ALBUMIN SERPL BCP-MCNC: 3.8 G/DL (ref 3.5–5.2)
ALP SERPL-CCNC: 87 U/L (ref 55–135)
ALT SERPL W/O P-5'-P-CCNC: 14 U/L (ref 10–44)
ANION GAP SERPL CALC-SCNC: 11 MMOL/L (ref 8–16)
AST SERPL-CCNC: 18 U/L (ref 10–40)
BASOPHILS # BLD AUTO: 0.03 K/UL (ref 0–0.2)
BASOPHILS NFR BLD: 0.6 % (ref 0–1.9)
BILIRUB SERPL-MCNC: 0.5 MG/DL (ref 0.1–1)
BUN SERPL-MCNC: 14 MG/DL (ref 6–20)
CALCIUM SERPL-MCNC: 9.3 MG/DL (ref 8.7–10.5)
CHLORIDE SERPL-SCNC: 108 MMOL/L (ref 95–110)
CK SERPL-CCNC: 261 U/L (ref 20–200)
CO2 SERPL-SCNC: 23 MMOL/L (ref 23–29)
CREAT SERPL-MCNC: 1 MG/DL (ref 0.5–1.4)
DIFFERENTIAL METHOD: ABNORMAL
EOSINOPHIL # BLD AUTO: 0.1 K/UL (ref 0–0.5)
EOSINOPHIL NFR BLD: 1.3 % (ref 0–8)
ERYTHROCYTE [DISTWIDTH] IN BLOOD BY AUTOMATED COUNT: 14.4 % (ref 11.5–14.5)
EST. GFR  (NO RACE VARIABLE): >60 ML/MIN/1.73 M^2
GLUCOSE SERPL-MCNC: 84 MG/DL (ref 70–110)
HCT VFR BLD AUTO: 37.5 % (ref 40–54)
HCV AB SERPL QL IA: REACTIVE
HGB BLD-MCNC: 11.5 G/DL (ref 14–18)
IMM GRANULOCYTES # BLD AUTO: 0.02 K/UL (ref 0–0.04)
IMM GRANULOCYTES NFR BLD AUTO: 0.4 % (ref 0–0.5)
LYMPHOCYTES # BLD AUTO: 1.5 K/UL (ref 1–4.8)
LYMPHOCYTES NFR BLD: 30.5 % (ref 18–48)
MCH RBC QN AUTO: 27.5 PG (ref 27–31)
MCHC RBC AUTO-ENTMCNC: 30.7 G/DL (ref 32–36)
MCV RBC AUTO: 90 FL (ref 82–98)
MONOCYTES # BLD AUTO: 0.3 K/UL (ref 0.3–1)
MONOCYTES NFR BLD: 6.9 % (ref 4–15)
NEUTROPHILS # BLD AUTO: 2.9 K/UL (ref 1.8–7.7)
NEUTROPHILS NFR BLD: 60.3 % (ref 38–73)
NRBC BLD-RTO: 0 /100 WBC
PLATELET # BLD AUTO: 295 K/UL (ref 150–450)
PMV BLD AUTO: 8.7 FL (ref 9.2–12.9)
POTASSIUM SERPL-SCNC: 3.7 MMOL/L (ref 3.5–5.1)
PROT SERPL-MCNC: 7.5 G/DL (ref 6–8.4)
RBC # BLD AUTO: 4.18 M/UL (ref 4.6–6.2)
SODIUM SERPL-SCNC: 142 MMOL/L (ref 136–145)
WBC # BLD AUTO: 4.78 K/UL (ref 3.9–12.7)

## 2023-07-30 PROCEDURE — 82550 ASSAY OF CK (CPK): CPT | Performed by: PHYSICIAN ASSISTANT

## 2023-07-30 PROCEDURE — 86592 SYPHILIS TEST NON-TREP QUAL: CPT | Performed by: PHYSICIAN ASSISTANT

## 2023-07-30 PROCEDURE — 85025 COMPLETE CBC W/AUTO DIFF WBC: CPT | Performed by: PHYSICIAN ASSISTANT

## 2023-07-30 PROCEDURE — 80053 COMPREHEN METABOLIC PANEL: CPT | Performed by: PHYSICIAN ASSISTANT

## 2023-07-30 PROCEDURE — 99284 EMERGENCY DEPT VISIT MOD MDM: CPT

## 2023-07-30 PROCEDURE — 87522 HEPATITIS C REVRS TRNSCRPJ: CPT | Performed by: PHYSICIAN ASSISTANT

## 2023-07-30 PROCEDURE — 86803 HEPATITIS C AB TEST: CPT | Performed by: PHYSICIAN ASSISTANT

## 2023-07-30 RX ORDER — AMITRIPTYLINE HYDROCHLORIDE 50 MG/1
50 TABLET, FILM COATED ORAL 2 TIMES DAILY
Qty: 30 TABLET | Refills: 0 | Status: SHIPPED | OUTPATIENT
Start: 2023-07-30

## 2023-07-30 RX ORDER — ACETAMINOPHEN 500 MG
1000 TABLET ORAL
Status: DISCONTINUED | OUTPATIENT
Start: 2023-07-30 | End: 2023-07-30 | Stop reason: HOSPADM

## 2023-07-30 RX ORDER — SERTRALINE HYDROCHLORIDE 100 MG/1
100 TABLET, FILM COATED ORAL DAILY
Qty: 30 TABLET | Refills: 0 | Status: SHIPPED | OUTPATIENT
Start: 2023-07-30

## 2023-07-30 RX ORDER — BICTEGRAVIR SODIUM, EMTRICITABINE, AND TENOFOVIR ALAFENAMIDE FUMARATE 50; 200; 25 MG/1; MG/1; MG/1
1 TABLET ORAL DAILY
Qty: 30 TABLET | Refills: 0 | Status: SHIPPED | OUTPATIENT
Start: 2023-07-30

## 2023-07-30 SDOH — SOCIAL DETERMINANTS OF HEALTH (SDOH): HOMELESSNESS UNSPECIFIED: Z59.00

## 2023-07-30 NOTE — PLAN OF CARE
07/30/23 1847   Post-Acute Status   Post-Acute Authorization Other   Other Status Community Services  (shelters and group homes list)   Hospital Resources/Appts/Education Provided Provided patient/caregiver with written discharge plan information   Discharge Delays None known at this time   Discharge Plan   Discharge Plan A Shelter

## 2023-07-30 NOTE — PLAN OF CARE
Initial Discharge Planning Case Management Assessment:    Patient admitted on 7-30-23  Chart reviewed today  Care plan discussed with ER treatment team,    attending Dr Platt    DME at home: cane  Current dispo: home/shelter today  Resources provided to Mr Guerrero  Transportation: public/RTA  Consults following: case mgt  Case management  to follow    [facility] Dept  Initial Discharge Assessment       Primary Care Provider: Primary Doctor No    Admission Diagnosis: No admission diagnoses are documented for this encounter.    Admission Date: 7/30/2023  Expected Discharge Date:          Payor: MEDICAID / Plan: [facility] CONNECT / Product Type: Managed Medicaid /     No emergency contact information on file.    Discharge Plan A: (P) Shelter         [facility]  [address]  [address]   [place] [address]  Phone: [phone] Fax: [phone]    [facility] [address] [place], [place] - [address]  [address]  [facility] [address]  Phone: [phone] Fax: [phone]      Initial Assessment (most recent)       Adult Discharge Assessment - 07/30/23 1839          Discharge Assessment    Assessment Type Discharge Planning Assessment (P)      Confirmed/corrected address, phone number and insurance Yes (P)      Source of Information patient;health record (P)      People in Home alone (P)      Do you expect to return to your current living situation? Yes (P)      Do you have help at home or someone to help you manage your care at home? No (P)      Prior to hospitilization cognitive status: Alert/Oriented (P)      Current cognitive status: Alert/Oriented (P)      Equipment Currently Used at Home other (see comments) (P)    wooden cane    Patient currently being followed by outpatient case management? No (P)      Do you take prescription medications? Yes (P)      Do you have prescription coverage? Yes (P)      Do you have any problems affording any  of your prescribed medications? TBD (P)      How do you get to doctors appointments? health plan transportation;public transportation (P)      Discharge Plan A Shelter (P)      DME Needed Upon Discharge  none (P)         Physical Activity    On average, how many days per week do you engage in moderate to strenuous exercise (like a brisk walk)? Patient refused (P)         Financial Resource Strain    How hard is it for you to pay for the very basics like food, housing, medical care, and heating? Patient refused (P)         Housing Stability    In the last 12 months, was there a time when you were not able to pay the mortgage or rent on time? Patient refused (P)      In the last 12 months, was there a time when you did not have a steady place to sleep or slept in a shelter (including now)? Patient refused (P)         Transportation Needs    In the past 12 months, has lack of transportation kept you from medical appointments or from getting medications? Patient refused (P)         Food Insecurity    Within the past 12 months, you worried that your food would run out before you got the money to buy more. Patient refused (P)         Stress    Do you feel stress - tense, restless, nervous, or anxious, or unable to sleep at night because your mind is troubled all the time - these days? Patient refused (P)         Social Connections    In a typical week, how many times do you talk on the phone with family, friends, or neighbors? Patient refused (P)      How often do you attend meetings of the clubs or organizations you belong to? Patient refused (P)         Alcohol Use    Q1: How often do you have a drink containing alcohol? Patient refused (P)

## 2023-07-30 NOTE — ED NOTES
Dr. Gomez is speaking to the patient. Patient is no longer a [employer]. Per Dr Gomez, pt can go to intake
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
## 2023-07-31 LAB
HCV RNA SERPL QL NAA+PROBE: NOT DETECTED
HCV RNA SPEC NAA+PROBE-ACNC: NOT DETECTED IU/ML
RPR SER QL: NORMAL

## 2023-07-31 NOTE — ED NOTES
Pt refused to be discharged from [facility]. Asked to remove IV twice and pt became verbally disruptive/aggressive. Explained the discharge paperwork. Still refused to leave. Called security to assist. Pt repeated same behaviors, being verbally aggressive to officer. Officer able to convince pt to have IV removed. Pt continued behavior upon exit into main hospital.

## 2023-08-11 ENCOUNTER — HOSPITAL ENCOUNTER (EMERGENCY)
Facility: HOSPITAL | Age: 43
Discharge: LEFT BEFORE DISCHARGE | End: 2023-08-12
Attending: EMERGENCY MEDICINE
Payer: MEDICAID

## 2023-08-11 DIAGNOSIS — Z53.29 LEFT AGAINST MEDICAL ADVICE: ICD-10-CM

## 2023-08-11 DIAGNOSIS — T40.604A: Primary | ICD-10-CM

## 2023-08-11 DIAGNOSIS — M54.9 BACK PAIN: ICD-10-CM

## 2023-08-11 PROCEDURE — 99283 EMERGENCY DEPT VISIT LOW MDM: CPT

## 2023-08-12 VITALS
OXYGEN SATURATION: 100 % | RESPIRATION RATE: 15 BRPM | HEART RATE: 60 BPM | TEMPERATURE: 98 F | DIASTOLIC BLOOD PRESSURE: 74 MMHG | SYSTOLIC BLOOD PRESSURE: 107 MMHG

## 2023-08-12 LAB
ALBUMIN SERPL BCP-MCNC: 3.9 G/DL (ref 3.5–5.2)
ALP SERPL-CCNC: 91 U/L (ref 55–135)
ALT SERPL W/O P-5'-P-CCNC: 19 U/L (ref 10–44)
ANION GAP SERPL CALC-SCNC: 8 MMOL/L (ref 8–16)
AST SERPL-CCNC: 19 U/L (ref 10–40)
BILIRUB SERPL-MCNC: 0.5 MG/DL (ref 0.1–1)
BUN SERPL-MCNC: 17 MG/DL (ref 6–20)
CALCIUM SERPL-MCNC: 9.2 MG/DL (ref 8.7–10.5)
CHLORIDE SERPL-SCNC: 107 MMOL/L (ref 95–110)
CO2 SERPL-SCNC: 24 MMOL/L (ref 23–29)
CREAT SERPL-MCNC: 1.1 MG/DL (ref 0.5–1.4)
EST. GFR  (NO RACE VARIABLE): >60 ML/MIN/1.73 M^2
GLUCOSE SERPL-MCNC: 93 MG/DL (ref 70–110)
HIV 1+2 AB+HIV1 P24 AG SERPL QL IA: REACTIVE
HIV1+2 IGG SERPL QL IA.RAPID: REACTIVE
POTASSIUM SERPL-SCNC: 4.1 MMOL/L (ref 3.5–5.1)
PROT SERPL-MCNC: 7.4 G/DL (ref 6–8.4)
SODIUM SERPL-SCNC: 139 MMOL/L (ref 136–145)

## 2023-08-12 PROCEDURE — 85025 COMPLETE CBC W/AUTO DIFF WBC: CPT | Performed by: EMERGENCY MEDICINE

## 2023-08-12 PROCEDURE — 87389 HIV-1 AG W/HIV-1&-2 AB AG IA: CPT | Mod: 91 | Performed by: EMERGENCY MEDICINE

## 2023-08-12 PROCEDURE — 87389 HIV-1 AG W/HIV-1&-2 AB AG IA: CPT | Performed by: EMERGENCY MEDICINE

## 2023-08-12 PROCEDURE — 80053 COMPREHEN METABOLIC PANEL: CPT | Performed by: EMERGENCY MEDICINE

## 2023-08-12 PROCEDURE — 86703 HIV-1/HIV-2 1 RESULT ANTBDY: CPT | Performed by: EMERGENCY MEDICINE

## 2023-08-12 RX ORDER — NALOXONE HCL 0.4 MG/ML
0.4 VIAL (ML) INJECTION
Status: DISCONTINUED | OUTPATIENT
Start: 2023-08-12 | End: 2023-08-12 | Stop reason: HOSPADM

## 2023-08-12 RX ORDER — NALOXONE HYDROCHLORIDE 4 MG/.1ML
SPRAY NASAL
Qty: 1 EACH | Refills: 11 | Status: SHIPPED | OUTPATIENT
Start: 2023-08-12

## 2023-08-12 NOTE — ED NOTES
"Patient stopped responding to questions and stimulation. Attempted to sternal rub with no response  called for help. Patient responded to sternal rub after multiple attempts from additional RNs. Patient states that " I was ignoring y'all and that is aggravating. Don't do it again." MD ordered additional naloxone. Patient refused stating that " I don't need it because it is a known issue, I just sometimes go out, myla seen neurology." Patient is agitated cursing at staff and repeatedly asking for food.      "

## 2023-08-12 NOTE — ED NOTES
Patient unhooked blood pressure cuff, EKG and pulse ox. States that he is feeling better and would like to leave. Patient educated on the need for monitoring. However, patient states that he doesn't feel safe and would like to leave. Patient cursing at staff, security at bedside MD notified.

## 2023-08-12 NOTE — ED PROVIDER NOTES
Encounter Date: 8/11/2023    SCRIBE #1 NOTE: I, Loy May, am scribing for, and in the presence of,  Jori Kovacs MD.       History     Chief Complaint   Patient presents with    Drug Overdose     JP called by pt's former roommate due to pt trying to get into house and not leaving. While in [facility] custody, pt slumped over in squad car and [facility] called. JPSO administered 4mg intranasal with positive response. Pt is not cooperative in triage with answering questions nor was he cooperative with EMS or JP. Pt is on stretcher covering his face and is agitated.      39 y.o. adult presents to the ED due to syncope.   Police found patient trying to break into a former living space and arrested him. Patient lost consciousness in the police vehicle and woke up after given intranasal narcan. EMS arrived and reports that the patient had a headache and denied any drug use.        The history is provided by the patient.     Review of patient's allergies indicates:   Allergen Reactions    Ibuprofen Rash    Ibuprofen Rash    Vicodin [hydrocodone-acetaminophen] Rash     Past Medical History:   Diagnosis Date    Depression     Human immunodeficiency virus (HIV) disease     IBS (irritable bowel syndrome)     PTSD (post-traumatic stress disorder)      Past Surgical History:   Procedure Laterality Date    FOOT SURGERY      INCISION AND DRAINAGE, UPPER EXTREMITY Left 5/27/2023    Procedure: INCISION AND DRAINAGE, UPPER EXTREMITY;  Surgeon: Jassi West MD;  [facility];  Service: Orthopedics;  Laterality: Left;     History reviewed. No pertinent family history.  Social History     Tobacco Use    Smoking status: Some Days     Types: Cigarettes    Smokeless tobacco: Never   Substance Use Topics    Alcohol use: Not Currently    Drug use: Yes     Types: Marijuana     Review of Systems   Constitutional:  Negative for diaphoresis.   HENT:  Negative for congestion.    Eyes:  Negative for photophobia.   Respiratory:   Negative for cough.    Cardiovascular:  Negative for chest pain.   Gastrointestinal:  Negative for abdominal pain.   Genitourinary:  Negative for flank pain.   Musculoskeletal:  Negative for back pain.   Skin:  Negative for wound.   Neurological:  Positive for syncope and headaches.       Physical Exam     Initial Vitals [08/11/23 2334]   BP Pulse Resp Temp SpO2   130/80 76 20 98.2 °F (36.8 °C) 100 %      MAP       --         Physical Exam    Constitutional: He appears well-developed and well-nourished. He is not diaphoretic. No distress.   HENT:   Head: Normocephalic and atraumatic.   There is a scrape on his scalp.   Eyes: Right eye exhibits no discharge. No scleral icterus.   Neck: No tracheal deviation present. No JVD present.   Cardiovascular:  Regular rhythm and intact distal pulses.     Exam reveals no gallop and no friction rub.       No murmur heard.  Pulmonary/Chest: No stridor. No respiratory distress. He has no wheezes. He has no rhonchi. He has no rales.   Abdominal: Abdomen is soft. He exhibits no distension. There is no abdominal tenderness.   Musculoskeletal:         General: No edema.      Comments: There is neck tenderness with ROM.  There is thoracic midline tenderness.     Neurological: He is alert and oriented to person, place, and time.   Skin: Skin is warm and dry. No rash noted. No erythema.   No signs of trauma.  There are multiple ulcerations over his arms and various stages of healing.  There are dark lesions on his palms.   Psychiatric: He has a normal mood and affect. His behavior is normal. Judgment and thought content normal.         ED Course   Critical Care    Date/Time: 9/11/2023 10:17 PM    Performed by: Jori Kovacs MD  Authorized by: Jori Kovacs MD  Direct patient critical care time: 15 minutes  Ordering / reviewing critical care time: 10 minutes  Documentation critical care time: 10 minutes  Total critical care time (exclusive of procedural time) : 35  minutes  Critical care time was exclusive of separately billable procedures and treating other patients and teaching time.  Critical care was necessary to treat or prevent imminent or life-threatening deterioration of the following conditions: respiratory failure.  Critical care was time spent personally by me on the following activities: blood draw for specimens, development of treatment plan with patient or surrogate, obtaining history from patient or surrogate, ordering and performing treatments and interventions, ordering and review of laboratory studies, ordering and review of radiographic studies, pulse oximetry, re-evaluation of patient's condition, examination of patient and evaluation of patient's response to treatment.  Comments: Opiate overdose requiring Narcan administration for reversal.        Labs Reviewed   RAPID HIV - Abnormal; Notable for the following components:       Result Value    HIV Rapid Testing Reactive (*)     All other components within normal limits    Narrative:     Release to patient->Immediate   HIV 1 / 2 ANTIBODY - Abnormal; Notable for the following components:    HIV 1/2 Ag/Ab Reactive (*)     All other components within normal limits    Narrative:     Release to patient->Immediate   COMPREHENSIVE METABOLIC PANEL    Narrative:     Release to patient->Immediate          Imaging Results    None          Medications - No data to display  Medical Decision Making:   Initial Assessment:   42-year-old male presenting following possible opiate overdose.  Patient was initially agitated with police and after being handcuffed in the car patient had bradypnea episode.  Patient was administered Narcan via police.  Patient initially denied any illicit drug use.  Patient appears to have track marks over his arms with ulcerations.  No active cellulitis.  Patient did have drug paraphernalia in his backpack.  Patient had multiple needles.  Patient had a 2nd episode of somnolence and bradypnea that  improved with Narcan administration while in the emergency room.  Patient was prescribed Narcan.  Patient did not want any resources for rehab.  HIV and syphilis testing ordered for the patient given multiple ulcerations.  Patient did not want to wait for lab results.  Patient left against medical advice.  Patient was encouraged to return for any concerns.  Differential Diagnosis:   Accidental opiate overdose  Clinical Tests:   Lab Tests: Ordered             ED Course as of 09/11/23 2218   Sat Aug 12, 2023   0057 The patient required a second dose of narcan as the patient would not wake to sternal rub per nursing staff.  [ERIK]   0128 The patient states that he no longer has a headache, neck pain, or back pain. He is refusing any imaging.  [JM]      ED Course User Index  [JM] Jori Kovacs MD                 I, Jori Kovacs, personally performed the services described in this documentation. All medical record entries made by the scribe were at my direction and in my presence. I have reviewed the chart and agree that the record reflects my personal performance and is accurate and complete.    Clinical Impression:   Final diagnoses:  [M54.9] Back pain  [T40.604A] Narcotic overdose, undetermined intent, initial encounter (Primary)  [Z53.29] Left against medical advice        ED Disposition Condition    AMA Stable                Jori Kovacs MD  08/12/23 0524       Jori Kovacs MD  09/11/23 2212

## 2023-08-18 NOTE — PROGRESS NOTES
Attempted calling the patient's provided home phone to discuss the reactive HIV results however the phone number is disconnected. Unable to reach the patient.

## (undated) DEVICE — GOWN XX-LARGE

## (undated) DEVICE — SEE MEDLINE ITEM 146292

## (undated) DEVICE — DRAPE PLASTIC U 60X72

## (undated) DEVICE — UNGERGLOVE BIOGEL PI INDIC SZ9

## (undated) DEVICE — SEE MEDLINE ITEM 157110

## (undated) DEVICE — GLOVE BIOGEL SZ 8 1/2

## (undated) DEVICE — ELECTRODE REM PLYHSV RETURN 9

## (undated) DEVICE — Device

## (undated) DEVICE — SEE MEDLINE ITEM 107746

## (undated) DEVICE — SEE MEDLINE ITEM 157173

## (undated) DEVICE — APPLICATOR CHLORAPREP ORN 26ML